# Patient Record
Sex: FEMALE | Race: WHITE | NOT HISPANIC OR LATINO | Employment: OTHER | URBAN - METROPOLITAN AREA
[De-identification: names, ages, dates, MRNs, and addresses within clinical notes are randomized per-mention and may not be internally consistent; named-entity substitution may affect disease eponyms.]

---

## 2017-01-09 DIAGNOSIS — C50.411 MALIGNANT NEOPLASM OF UPPER-OUTER QUADRANT OF RIGHT FEMALE BREAST (HCC): ICD-10-CM

## 2017-01-09 DIAGNOSIS — M81.0 OSTEOPOROSIS: Primary | ICD-10-CM

## 2017-01-11 ENCOUNTER — LAB (OUTPATIENT)
Dept: ONCOLOGY | Facility: HOSPITAL | Age: 72
End: 2017-01-11

## 2017-01-11 ENCOUNTER — OFFICE VISIT (OUTPATIENT)
Dept: ONCOLOGY | Facility: CLINIC | Age: 72
End: 2017-01-11

## 2017-01-11 ENCOUNTER — INFUSION (OUTPATIENT)
Dept: ONCOLOGY | Facility: HOSPITAL | Age: 72
End: 2017-01-11

## 2017-01-11 VITALS
SYSTOLIC BLOOD PRESSURE: 173 MMHG | HEIGHT: 60 IN | DIASTOLIC BLOOD PRESSURE: 82 MMHG | HEART RATE: 67 BPM | WEIGHT: 107 LBS | TEMPERATURE: 97.7 F | RESPIRATION RATE: 16 BRPM | OXYGEN SATURATION: 97 % | BODY MASS INDEX: 21.01 KG/M2

## 2017-01-11 DIAGNOSIS — D3A.8 PRIMARY PANCREATIC NEUROENDOCRINE TUMOR: Primary | ICD-10-CM

## 2017-01-11 DIAGNOSIS — C50.411 MALIGNANT NEOPLASM OF UPPER-OUTER QUADRANT OF RIGHT FEMALE BREAST (HCC): ICD-10-CM

## 2017-01-11 DIAGNOSIS — M81.0 OSTEOPOROSIS: ICD-10-CM

## 2017-01-11 DIAGNOSIS — M81.0 OSTEOPOROSIS: Primary | ICD-10-CM

## 2017-01-11 LAB
ALBUMIN SERPL-MCNC: 4 G/DL (ref 3.5–5.2)
ALBUMIN/GLOB SERPL: 1.4 G/DL
ALP SERPL-CCNC: 66 U/L (ref 39–117)
ALT SERPL W P-5'-P-CCNC: 52 U/L (ref 1–33)
ANION GAP SERPL CALCULATED.3IONS-SCNC: 8.4 MMOL/L
AST SERPL-CCNC: 42 U/L (ref 1–32)
BASOPHILS # BLD AUTO: 0.06 10*3/MM3 (ref 0–0.2)
BASOPHILS NFR BLD AUTO: 1 % (ref 0–1.5)
BILIRUB SERPL-MCNC: 0.2 MG/DL (ref 0.1–1.2)
BUN BLD-MCNC: 12 MG/DL (ref 8–23)
BUN/CREAT SERPL: 13 (ref 7–25)
CALCIUM SPEC-SCNC: 10.6 MG/DL (ref 8.6–10.5)
CHLORIDE SERPL-SCNC: 104 MMOL/L (ref 98–107)
CO2 SERPL-SCNC: 29.6 MMOL/L (ref 22–29)
CREAT BLD-MCNC: 0.92 MG/DL (ref 0.57–1)
DEPRECATED RDW RBC AUTO: 66.5 FL (ref 37–54)
EOSINOPHIL # BLD AUTO: 0.13 10*3/MM3 (ref 0–0.7)
EOSINOPHIL NFR BLD AUTO: 2.1 % (ref 0.3–6.2)
ERYTHROCYTE [DISTWIDTH] IN BLOOD BY AUTOMATED COUNT: 19 % (ref 11.7–13)
GFR SERPL CREATININE-BSD FRML MDRD: 60 ML/MIN/1.73
GLOBULIN UR ELPH-MCNC: 2.9 GM/DL
GLUCOSE BLD-MCNC: 97 MG/DL (ref 65–99)
HCT VFR BLD AUTO: 39.4 % (ref 35.6–45.5)
HGB BLD-MCNC: 13 G/DL (ref 11.9–15.5)
IMM GRANULOCYTES # BLD: 0.01 10*3/MM3 (ref 0–0.03)
IMM GRANULOCYTES NFR BLD: 0.2 % (ref 0–0.5)
LYMPHOCYTES # BLD AUTO: 2.2 10*3/MM3 (ref 0.9–4.8)
LYMPHOCYTES NFR BLD AUTO: 36.4 % (ref 19.6–45.3)
MAGNESIUM SERPL-MCNC: 2.1 MG/DL (ref 1.6–2.4)
MCH RBC QN AUTO: 31.6 PG (ref 26.9–32)
MCHC RBC AUTO-ENTMCNC: 33 G/DL (ref 32.4–36.3)
MCV RBC AUTO: 95.9 FL (ref 80.5–98.2)
MONOCYTES # BLD AUTO: 0.72 10*3/MM3 (ref 0.2–1.2)
MONOCYTES NFR BLD AUTO: 11.9 % (ref 5–12)
NEUTROPHILS # BLD AUTO: 2.93 10*3/MM3 (ref 1.9–8.1)
NEUTROPHILS NFR BLD AUTO: 48.4 % (ref 42.7–76)
NRBC BLD MANUAL-RTO: 0 /100 WBC (ref 0–0)
PHOSPHATE SERPL-MCNC: 2.6 MG/DL (ref 2.5–4.5)
PLATELET # BLD AUTO: 325 10*3/MM3 (ref 140–500)
PMV BLD AUTO: 10.8 FL (ref 6–12)
POTASSIUM BLD-SCNC: 4.3 MMOL/L (ref 3.5–5.2)
PROT SERPL-MCNC: 6.9 G/DL (ref 6–8.5)
RBC # BLD AUTO: 4.11 10*6/MM3 (ref 3.9–5.2)
SODIUM BLD-SCNC: 142 MMOL/L (ref 136–145)
WBC NRBC COR # BLD: 6.05 10*3/MM3 (ref 4.5–10.7)

## 2017-01-11 PROCEDURE — 36415 COLL VENOUS BLD VENIPUNCTURE: CPT

## 2017-01-11 PROCEDURE — 83735 ASSAY OF MAGNESIUM: CPT | Performed by: INTERNAL MEDICINE

## 2017-01-11 PROCEDURE — 85025 COMPLETE CBC W/AUTO DIFF WBC: CPT | Performed by: INTERNAL MEDICINE

## 2017-01-11 PROCEDURE — 80053 COMPREHEN METABOLIC PANEL: CPT | Performed by: INTERNAL MEDICINE

## 2017-01-11 PROCEDURE — 96372 THER/PROPH/DIAG INJ SC/IM: CPT | Performed by: NURSE PRACTITIONER

## 2017-01-11 PROCEDURE — 84100 ASSAY OF PHOSPHORUS: CPT | Performed by: INTERNAL MEDICINE

## 2017-01-11 PROCEDURE — 99212-NC PR NO CHARGE CBC OFFICE OUTPATIENT VISIT 10 MINUTES: Performed by: NURSE PRACTITIONER

## 2017-01-11 PROCEDURE — 25010000002 DENOSUMAB 60 MG/ML SOLUTION: Performed by: NURSE PRACTITIONER

## 2017-01-11 RX ADMIN — DENOSUMAB 60 MG: 60 INJECTION SUBCUTANEOUS at 13:30

## 2017-01-11 NOTE — PROGRESS NOTES
Subjective    REASONS FOR FOLLOWUP:    1. Stage I breast cancer on Arimidex.    2. MEN-1 syndrome with persistent hypercalcemia.    3. 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    4. Removal of fifth parathyroid in 8/09, benign pathology.    5. Mild elevation of chromogranin and gastrin post pancreatectomy of uncertain etiology, probably related to PPIs.    6. Negative MRI of the abdomen 11/2014.    7. Marked elevation, repeat in our office down to 170 which is stable.    8. Palpable left axillary lymph node, evaluation planned.  Mass aspirated and felt to be a benign cyst  9. Reexploration for elevated PTH with no definite parathyroid tissue in 10/2015.                    10.  Osteoporosis on Prolia       History of Present Illness  Mrs. Tong is a pleasant 71 year old here today for Prolia. She reports feeling very well. She is eating and drinking well. No weight loss, fevers, chills, new pain, bony aches, shortness of breath, or palpable lumps.     She reports fatigue but her energy level has slightly improved since receiving IV iron. Her hgb is improved at 13.0.        Active Ambulatory Problems     Diagnosis Date Noted   • Primary pancreatic neuroendocrine tumor 06/17/2016   • MEN 1 (multiple endocrine neoplasia) 06/17/2016   • Malignant neoplasm of upper-outer quadrant of right female breast 06/17/2016   • Osteoporosis 07/01/2016   • Iron deficiency 10/12/2016     Resolved Ambulatory Problems     Diagnosis Date Noted   • No Resolved Ambulatory Problems     Past Medical History   Diagnosis Date   • Cancer    • H/O Endocrine tumors    • Hyperparathyroidism    • Intolerance of oral bisphosphonate therapy    • MEN 1 syndrome    • Osteopenia      Past Surgical History   Procedure Laterality Date   • Parathyroidectomy  1984     Subtotal   • Spine surgery  1991     Degenerative disks and herniated disks    bilateral mastectomies  Tristan  "procedure    Review of Systems   Constitutional: Positive for fatigue.   HENT: Negative.    Eyes: Negative.    Respiratory: Negative.    Cardiovascular: Negative.    Gastrointestinal: Negative.    Endocrine: Negative.    Genitourinary: Negative.    Musculoskeletal: Negative.    Skin: Negative.    Allergic/Immunologic: Negative.    Neurological: Negative.    Hematological: Negative.    Psychiatric/Behavioral: Negative.       A comprehensive 14 point review of systems was performed and was negative except as mentioned.    Medications:  The current medication list was reviewed in the EMR    ALLERGIES:    Allergies   Allergen Reactions   • Risedronate Sodium Other (See Comments)     Headache,GASTRIC REFLUX       Objective      Vitals:    01/11/17 1307   BP: 173/82   Pulse: 67   Resp: 16   Temp: 97.7 °F (36.5 °C)   TempSrc: Oral   SpO2: 97%   Weight: 107 lb (48.5 kg)   Height: 60.24\" (153 cm)   PainSc: 0-No pain     Current Status 1/11/2017   ECOG score 0       Physical Exam   Constitutional: She is oriented to person, place, and time. She appears well-developed and well-nourished.   Eyes: Conjunctivae and EOM are normal. Pupils are equal, round, and reactive to light.   Neck: Normal range of motion. Neck supple.   Cardiovascular: Normal rate and regular rhythm.    Pulmonary/Chest: Effort normal and breath sounds normal.   Abdominal: Soft. Bowel sounds are normal. She exhibits no mass. There is no tenderness. There is no guarding.   Musculoskeletal: Normal range of motion.   Neurological: She is alert and oriented to person, place, and time.   Skin: Skin is warm and dry. No rash noted.   Psychiatric: She has a normal mood and affect. Her behavior is normal.   Nursing note and vitals reviewed.            RECENT LABS:  Hematology WBC   Date Value Ref Range Status   01/11/2017 6.05 4.50 - 10.70 10*3/mm3 Final   05/25/2015 12.04 (H) 4.80 - 10.80 K/Cumm Final     RBC   Date Value Ref Range Status   01/11/2017 4.11 3.90 - 5.20 " 10*6/mm3 Final   05/25/2015 3.63 (L) 4.20 - 5.40 Million Final     HEMOGLOBIN   Date Value Ref Range Status   01/11/2017 13.0 11.9 - 15.5 g/dL Final   05/25/2015 11.4 (L) 12.0 - 16.0 g/dL Final     HEMATOCRIT   Date Value Ref Range Status   01/11/2017 39.4 35.6 - 45.5 % Final   05/25/2015 35.0 (L) 37.0 - 47.0 % Final     PLATELETS   Date Value Ref Range Status   01/11/2017 325 140 - 500 10*3/mm3 Final   05/25/2015 310 140 - 500 K/Cumm Final              Assessment/Plan    Assessment  1. T 1cN 0  appear positive HER-2 negative left breast cancer treated with bilateral mastectomies 5 years and aromatase inhibitors with an Oncotype score of 7   2.MEN1 syndrome post parathyroidectomy with persistent elevated calcium and inability to find her remaining parathyroid adenoma   3.pancreatic neuroendocrine carcinoma post Whipple procedure chromogranin and gastrin levels being followed   4.osteoporosis on Prolia. She is scheduled for Prolia today.  5.pulmonary nodules?  Granuloma being followed   6.remote history of uterine cancer post hysterectomy ovaries still in place   7.  Anemia multifactorial with iron deficiency in the past and chronic renal insufficiency stage III. She received IV Fereheme in October 2016 and her hgb is within normal limits today at 13.    Plan   1. Go forward with Prolia as scheduled today. She will return as already scheduled on 4/10/2016 to see Dr. Montalvo.   2. We reviewed signs and symptoms for which she needs to call the office.   3              1/11/2017      CC:

## 2017-04-10 ENCOUNTER — OFFICE VISIT (OUTPATIENT)
Dept: ONCOLOGY | Facility: CLINIC | Age: 72
End: 2017-04-10

## 2017-04-10 ENCOUNTER — LAB (OUTPATIENT)
Dept: OTHER | Facility: HOSPITAL | Age: 72
End: 2017-04-10

## 2017-04-10 VITALS
RESPIRATION RATE: 12 BRPM | HEART RATE: 65 BPM | TEMPERATURE: 98.1 F | WEIGHT: 103 LBS | SYSTOLIC BLOOD PRESSURE: 147 MMHG | DIASTOLIC BLOOD PRESSURE: 77 MMHG | BODY MASS INDEX: 20.22 KG/M2 | HEIGHT: 60 IN | OXYGEN SATURATION: 99 %

## 2017-04-10 DIAGNOSIS — M81.0 OSTEOPOROSIS: ICD-10-CM

## 2017-04-10 DIAGNOSIS — C7A.8 OTHER MALIGNANT NEUROENDOCRINE TUMORS (HCC): ICD-10-CM

## 2017-04-10 DIAGNOSIS — D3A.8 PRIMARY PANCREATIC NEUROENDOCRINE TUMOR: ICD-10-CM

## 2017-04-10 DIAGNOSIS — C50.411 MALIGNANT NEOPLASM OF UPPER-OUTER QUADRANT OF RIGHT FEMALE BREAST (HCC): ICD-10-CM

## 2017-04-10 DIAGNOSIS — E31.21 MEN 1 (MULTIPLE ENDOCRINE NEOPLASIA) (HCC): ICD-10-CM

## 2017-04-10 DIAGNOSIS — E31.21 MEN 1 (MULTIPLE ENDOCRINE NEOPLASIA) (HCC): Primary | ICD-10-CM

## 2017-04-10 LAB
ALBUMIN SERPL-MCNC: 4.1 G/DL (ref 3.5–5.2)
ALBUMIN/GLOB SERPL: 1.4 G/DL
ALP SERPL-CCNC: 68 U/L (ref 39–117)
ALT SERPL W P-5'-P-CCNC: 33 U/L (ref 1–33)
ANION GAP SERPL CALCULATED.3IONS-SCNC: 8.5 MMOL/L
AST SERPL-CCNC: 36 U/L (ref 1–32)
BASOPHILS # BLD AUTO: 0.07 10*3/MM3 (ref 0–0.2)
BASOPHILS NFR BLD AUTO: 1.4 % (ref 0–1.5)
BILIRUB SERPL-MCNC: 0.3 MG/DL (ref 0.1–1.2)
BUN BLD-MCNC: 13 MG/DL (ref 8–23)
BUN/CREAT SERPL: 12.4 (ref 7–25)
CALCIUM SPEC-SCNC: 10.3 MG/DL (ref 8.6–10.5)
CHLORIDE SERPL-SCNC: 104 MMOL/L (ref 98–107)
CO2 SERPL-SCNC: 29.5 MMOL/L (ref 22–29)
CREAT BLD-MCNC: 1.05 MG/DL (ref 0.57–1)
DEPRECATED RDW RBC AUTO: 49.6 FL (ref 37–54)
EOSINOPHIL # BLD AUTO: 0.12 10*3/MM3 (ref 0–0.7)
EOSINOPHIL NFR BLD AUTO: 2.4 % (ref 0.3–6.2)
ERYTHROCYTE [DISTWIDTH] IN BLOOD BY AUTOMATED COUNT: 13.4 % (ref 11.7–13)
GFR SERPL CREATININE-BSD FRML MDRD: 52 ML/MIN/1.73
GLOBULIN UR ELPH-MCNC: 2.9 GM/DL
GLUCOSE BLD-MCNC: 55 MG/DL (ref 65–99)
HCT VFR BLD AUTO: 40.8 % (ref 35.6–45.5)
HGB BLD-MCNC: 13.4 G/DL (ref 11.9–15.5)
IMM GRANULOCYTES # BLD: 0.01 10*3/MM3 (ref 0–0.03)
IMM GRANULOCYTES NFR BLD: 0.2 % (ref 0–0.5)
LYMPHOCYTES # BLD AUTO: 1.94 10*3/MM3 (ref 0.9–4.8)
LYMPHOCYTES NFR BLD AUTO: 39.6 % (ref 19.6–45.3)
MCH RBC QN AUTO: 32.9 PG (ref 26.9–32)
MCHC RBC AUTO-ENTMCNC: 32.8 G/DL (ref 32.4–36.3)
MCV RBC AUTO: 100.2 FL (ref 80.5–98.2)
MONOCYTES # BLD AUTO: 0.64 10*3/MM3 (ref 0.2–1.2)
MONOCYTES NFR BLD AUTO: 13.1 % (ref 5–12)
NEUTROPHILS # BLD AUTO: 2.12 10*3/MM3 (ref 1.9–8.1)
NEUTROPHILS NFR BLD AUTO: 43.3 % (ref 42.7–76)
NRBC BLD MANUAL-RTO: 0 /100 WBC (ref 0–0)
PLATELET # BLD AUTO: 336 10*3/MM3 (ref 140–500)
PMV BLD AUTO: 10.8 FL (ref 6–12)
POTASSIUM BLD-SCNC: 4.1 MMOL/L (ref 3.5–5.2)
PROT SERPL-MCNC: 7 G/DL (ref 6–8.5)
RBC # BLD AUTO: 4.07 10*6/MM3 (ref 3.9–5.2)
SODIUM BLD-SCNC: 142 MMOL/L (ref 136–145)
WBC NRBC COR # BLD: 4.9 10*3/MM3 (ref 4.5–10.7)

## 2017-04-10 PROCEDURE — 85025 COMPLETE CBC W/AUTO DIFF WBC: CPT | Performed by: INTERNAL MEDICINE

## 2017-04-10 PROCEDURE — 86316 IMMUNOASSAY TUMOR OTHER: CPT | Performed by: INTERNAL MEDICINE

## 2017-04-10 PROCEDURE — 82941 ASSAY OF GASTRIN: CPT | Performed by: INTERNAL MEDICINE

## 2017-04-10 PROCEDURE — 80053 COMPREHEN METABOLIC PANEL: CPT | Performed by: INTERNAL MEDICINE

## 2017-04-10 PROCEDURE — 36415 COLL VENOUS BLD VENIPUNCTURE: CPT

## 2017-04-10 PROCEDURE — 99214 OFFICE O/P EST MOD 30 MIN: CPT | Performed by: INTERNAL MEDICINE

## 2017-04-10 NOTE — PROGRESS NOTES
Subjective    REASONS FOR FOLLOWUP:    1. Stage I breast cancer on Arimidex.    2. MEN-1 syndrome with persistent hypercalcemia.    3. 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    4. Removal of fifth parathyroid in 8/09, benign pathology.    5. Mild elevation of chromogranin and gastrin post pancreatectomy of uncertain etiology, probably related to PPIs.    6. Negative MRI of the abdomen 11/2014.    7. Marked elevation, repeat in our office down to 170 which is stable.    8. Palpable left axillary lymph node, evaluation planned.  Mass aspirated and felt to be a benign cyst  9. Reexploration for elevated PTH with no definite parathyroid tissue in 10/2015.                    10.  Osteoporosis on Prolia       History of Present Illness  patient is a 71-year-old white female with MEN 1 syndrome with multiple parathyroid adenoma as pancreatic neuroendocrine carcinoma producing gastrin and a stage I breast cancer treated with 5 years of adjuvant Arimidex and bilateral mastectomies.  She is here for routine follow-up and to continue her prolia treatments for her osteoporosis     Since I saw her last she has had no new complaints except for some abdominal discomfort which resolved.  We did CAT scans for review this and I called her with the results which essentially showed no abnormalities which were new except for multiple cysts in her kidneys and liver which were stable.her lung nodules of been stable with a repeat scan in 9-12 months is recommended    She did not make her genetic counseling appointment which we had done because of a maternal history of breast and ovarian cancer which we felt was distinct from her MEN  1 syndrome and she does not want to do this at this time.  CAT scan of the chest shows stability of the pulmonary nodules suggesting benign etiology.  We wanted to follow-up on the lung nodule seen on her scan last year some  nodules that been present since  and others were new.  Her calcium remains high in we are unable to find her remaining parathyroid surgeon no surgery is planned.    Chromogranin and gastrin levels of been drawn today and are pending, the B12 was normal and the ferritin is pending.      PAST MEDICAL HISTORY: Significant for MEN-1 syndrome with hyperparathyroidism for which she had a subtotal parathyroidectomy in . She has had degenerative disks and had herniated disk operated on in . She denies hypertension, diabetes, peptic ulcer disease, heart attack, stroke, hepatitis, thyroid problems, anemia or rheumatic fever. Mild osteopenia. She is intolerant of bisphosphona t es and calcium. She is -1, para-1, first childbirth was age 28. Menarche was at age 13. Menopause in her early 50s. She did not take hormone therapy. The patient has persistent elevation of her calcium despite parathyroidectomy in the 11 range .   Active Ambulatory Problems     Diagnosis Date Noted   • Primary pancreatic neuroendocrine tumor 2016   • MEN 1 (multiple endocrine neoplasia) 2016   • Malignant neoplasm of upper-outer quadrant of right female breast 2016   • Osteoporosis 2016   • Iron deficiency 10/12/2016     Resolved Ambulatory Problems     Diagnosis Date Noted   • No Resolved Ambulatory Problems     Past Medical History:   Diagnosis Date   • Cancer    • H/O Endocrine tumors    • Hyperparathyroidism    • Intolerance of oral bisphosphonate therapy    • MEN 1 syndrome    • Osteopenia      Past Surgical History:   Procedure Laterality Date   • PARATHYROIDECTOMY      Subtotal   • SPINE SURGERY      Degenerative disks and herniated disks    bilateral mastectomies  Whipple procedure    ONCOLOGIC HISTORY:   #1: The patient had an in situ carcinoma of the uterus and had a hysterectomy in  with no radiation and her ovaries were left in place.     #2: Incidentally noted mammographic abnormality  of the right breast at 9 o'clock measuring 15 mm and at 1 o'clock measuring 9 mm. Biopsy of the 1 o'clock lesion was benign. The 9 o' clock lesion showed a low grade ductal carcinoma measuring 1 cm in greatest dimension, ER/WI positive, HER/2 negative, Ki-67 nae vated at 23%. The patient underwent bilateral mastectomies and mastectomy specimen showed a 1.4 cm residual breast cancer, grade 2 measuring 14 mm with no angiolymphatic invasion and clear margins, sentinel node was negative for malignancy. The invasive carcinoma had mixed duct and lobular features. Left breast was benign.   Onco type DX score showed a 7 which is low risk. Treatment with aromatase inhibitor plus Reclast is planned unless bone density shows marked worsening in which case we will give tamoxifen for two years and three years of an AI.   The patient's bone density shows osteopenia. The patient is on Arimidex with plans to start Reclast as she is intolerant to oral bisphosphonates.     #3 Patient had multiple pancreatic tumors which were endocrin e tumors, nonsecretory apparently per her history and had a total pancreatectomy and pancreaticoduodenectomy with hepatojejunostomy, cholecystectomy, splenectomy in December and retroperitoneal lymph node dissection with finding of microscopic metastatic disease in 1 of 26 lymph nodes.   No date on adjuvant treatment was available in the setting and none was given.   Gastrin level went up to 314 in March, back down to 124 in May of 2010. No treatment given. Chromogranin A was up to 18 in March, down to 14 in May, no treatment given.   Chromogranin A up to 28 on 6/23/10. Octreotide scan planned in early 2011.   CT scans chest, abdomen and pelvis 03/28/11 shows no evidence of recurrent disease. Chromogranin is 26, gastrin 192, a little higher than October but the same as last spring and lack of upward trend is reassuring.   CT scans dated 10/10/2011 showed no evidence of recurrent disease. Stable cavernous  hemangioma in the liver. Chromogranin level up to 100 on 09/28/2011 which is up from 39 in June but thi s is a new lab and we are waiting to see if this is a trend or a new baseline.   The patient was seen on 05/12/2012 with no complaints at four years on Arimidex. Chromogranin remains in the 100s. Gastrin is in the 190 range with negative CT scan.   The patient was seen on 1-08-13, doing fairly well with stable gastrin and chromogranin level of 90. Prolia was given in May but will not be renewed because she does not have osteoporosis but has very severe osteopenia.     The patient stopped Arimidex in July 2013, went on Evista for bone density issues. Chromogranin levels remain stable, but I do not have the levels from Dr. Bell' s office as of 12/2013. Annual followup to be continued with MRI of the liver at her next visit in one year.   Patient seen on 11/25/2014 and MRI of the abdomen and liver is unremarkable except for one new cyst in the liver and increasing number of renal cysts which are chronic and decrease in size of hemangioma in the liver that was noted previo usly. Chromogranin and gastrin levels remains stable in the endocrinologist' s office and I think these elevations are due to her PPIs. Calcium levels is mildly elevated and the parathyroid cannot be located and is being watched. Prolia to be given based on her bone density testing, which is pending.   The patient was seen on 06/16/2015 with marked elevation of chromogranin to 1600 (upper limit of normal 100 in new lab). Octreotide scan was done and negative. Repeat chromogranin testing was planned and if this is still elevated in our lab we will do an MRI of the abdomen to further evaluate and probably an upper endoscopy would be indicated to look for small bowel tumor chromogranin level  160 repeat.     SOCIAL HISTORY: She is  and lives with her . She works in an office. She does not smoke or drink. She has no risk factors for HIV.  "    FAMILY HISTORY: Father  at 71 of complications of Zollinger-Reed syndrome and kidney failure. Mother  at 64 of ovarian cancer metastatic to the colon. She has three half-brothers, one half-sister from her father. No ne of them have MEN-1 syndrome. She has a maternal grandmother who had probable breast cancer in her 50s and maternal great grandmother who  of breast cancer in her 50s.     Review of Systems   A comprehensive 14 point review of systems was performed and was negative except as mentioned.    Medications:  The current medication list was reviewed in the EMR    ALLERGIES:    Allergies   Allergen Reactions   • Risedronate Sodium Other (See Comments)     Headache,GASTRIC REFLUX       Objective      Vitals:    04/10/17 1300   Pulse: 65   Temp: 98.1 °F (36.7 °C)   TempSrc: Oral   SpO2: 99%   Weight: 103 lb (46.7 kg)   Height: 60.24\" (153 cm)   PainSc: 0-No pain     Current Status 4/10/2017   ECOG score 0       Physical Exam    GENERAL:  Well-developed, well-nourished in no acute distress.   SKIN:  Warm, dry without rashes, purpura or petechiae.  HEAD:  Normocephalic.  EYES:  Pupils equal, round and reactive to light.  EOMs intact.  Conjunctivae normal.  EARS:  Hearing intact.  NOSE:  Septum midline.  No excoriations or nasal discharge.  MOUTH:  Tongue is well-papillated; no stomatitis or ulcers.  Lips normal.  THROAT:  Oropharynx without lesions or exudates.  NECK:  Supple with good range of motion; no thyromegaly or masses, no JVD.  LYMPHATICS:  No cervical, supraclavicular, axillary or inguinal adenopathy.  CHEST:  Lungs clear to percussion and auscultation. Good airflow.  BREASTS: Bilateral chest wall are benign with no masses   CARDIAC:  Regular rate and rhythm without murmurs, rubs or gallops. Normal S1,S2.  ABDOMEN:  Soft, nontender with no organomegaly or masses.  EXTREMITIES:  No clubbing, cyanosis or edema.  NEUROLOGICAL:  Cranial Nerves II-XII grossly intact.  No focal neurological " deficits.  PSYCHIATRIC:  Normal affect and mood.        RECENT LABS:  Hematology WBC   Date Value Ref Range Status   04/10/2017 4.90 4.50 - 10.70 10*3/mm3 Final     RBC   Date Value Ref Range Status   04/10/2017 4.07 3.90 - 5.20 10*6/mm3 Final     Hemoglobin   Date Value Ref Range Status   04/10/2017 13.4 11.9 - 15.5 g/dL Final     Hematocrit   Date Value Ref Range Status   04/10/2017 40.8 35.6 - 45.5 % Final     Platelets   Date Value Ref Range Status   04/10/2017 336 140 - 500 10*3/mm3 Final          abdominal wall.      IMPRESSION:  Postoperative changes as described. Stable hemangioma in the  right lobe of the liver and a few small cysts within both lobes of the  liver. Small bilateral renal cysts. Moderately large amount stool within  the colon. Otherwise unremarkable CT scan of the abdomen and pelvis. No  acute process is identified. There is no evidence of metastatic disease.          Comment: Also noted but not mentioned above is a 7 mm diameter  noncalcified pulmonary nodule in the right middle lobe. This is  unchanged since the MRI dated 2015.      This report was finalized on 10/28/2016    Assessment/Plan   1. T 1cN 0  appear positive HER-2 negative left breast cancer treated with bilateral mastectomies 5 years of an  aromatase inhibitors with an Oncotype score of 7   2.MEN1 syndrome post parathyroidectomy with persistent elevated calcium and inability to find her remaining parathyroid adenoma   3.pancreatic neuroendocrine carcinoma post Whipple procedure  chromogranin and gastrin levels being followed   4.osteoporosis on Prolia   5.pulmonary nodules?  Granuloma -stable on CAT scan for 1. yrs  6.remote history of uterine cancer post hysterectomy ovaries still in place -mother  with ovarian cancer but she has declined genetic testing for now  7.  Anemia multifactorial with iron deficiency in the past and chronic renal insufficiency stage III-low ferritin consistent with iron deficiency IV iron  planned    Plan   1.Prolia in July 2. she will return in 6 months with a chromogranin level and gastrin and we will schedule one last chest CT to follow-up nodules at that visit                  4/10/2017      CC:

## 2017-04-13 LAB — GASTRIN SERPL-MCNC: 171 PG/ML (ref 0–115)

## 2017-04-14 LAB — CGA SERPL-SCNC: 26 NMOL/L (ref 0–5)

## 2017-07-13 DIAGNOSIS — M81.0 OSTEOPOROSIS: ICD-10-CM

## 2017-07-13 DIAGNOSIS — E61.1 IRON DEFICIENCY: Primary | ICD-10-CM

## 2017-07-14 ENCOUNTER — INFUSION (OUTPATIENT)
Dept: ONCOLOGY | Facility: HOSPITAL | Age: 72
End: 2017-07-14

## 2017-07-14 ENCOUNTER — LAB (OUTPATIENT)
Dept: OTHER | Facility: HOSPITAL | Age: 72
End: 2017-07-14

## 2017-07-14 VITALS
TEMPERATURE: 98.8 F | BODY MASS INDEX: 19.53 KG/M2 | HEART RATE: 83 BPM | WEIGHT: 100.8 LBS | DIASTOLIC BLOOD PRESSURE: 79 MMHG | OXYGEN SATURATION: 98 % | SYSTOLIC BLOOD PRESSURE: 152 MMHG

## 2017-07-14 DIAGNOSIS — C7A.8 OTHER MALIGNANT NEUROENDOCRINE TUMORS (HCC): ICD-10-CM

## 2017-07-14 DIAGNOSIS — E61.1 IRON DEFICIENCY: ICD-10-CM

## 2017-07-14 DIAGNOSIS — M81.0 OSTEOPOROSIS: ICD-10-CM

## 2017-07-14 DIAGNOSIS — M81.0 OSTEOPOROSIS: Primary | ICD-10-CM

## 2017-07-14 DIAGNOSIS — C50.411 MALIGNANT NEOPLASM OF UPPER-OUTER QUADRANT OF RIGHT FEMALE BREAST (HCC): ICD-10-CM

## 2017-07-14 DIAGNOSIS — E31.21 MEN 1 (MULTIPLE ENDOCRINE NEOPLASIA) (HCC): ICD-10-CM

## 2017-07-14 LAB
ALBUMIN SERPL-MCNC: 4.3 G/DL (ref 3.5–5.2)
ALBUMIN/GLOB SERPL: 1.6 G/DL
ALP SERPL-CCNC: 69 U/L (ref 39–117)
ALT SERPL W P-5'-P-CCNC: 72 U/L (ref 1–33)
ANION GAP SERPL CALCULATED.3IONS-SCNC: 9.1 MMOL/L
AST SERPL-CCNC: 65 U/L (ref 1–32)
BASOPHILS # BLD AUTO: 0.05 10*3/MM3 (ref 0–0.2)
BASOPHILS NFR BLD AUTO: 1 % (ref 0–1.5)
BILIRUB SERPL-MCNC: 0.4 MG/DL (ref 0.1–1.2)
BUN BLD-MCNC: 16 MG/DL (ref 8–23)
BUN/CREAT SERPL: 14.7 (ref 7–25)
CALCIUM SPEC-SCNC: 10.6 MG/DL (ref 8.6–10.5)
CHLORIDE SERPL-SCNC: 105 MMOL/L (ref 98–107)
CO2 SERPL-SCNC: 29.9 MMOL/L (ref 22–29)
CREAT BLD-MCNC: 1.09 MG/DL (ref 0.57–1)
DEPRECATED RDW RBC AUTO: 49.6 FL (ref 37–54)
EOSINOPHIL # BLD AUTO: 0.12 10*3/MM3 (ref 0–0.7)
EOSINOPHIL NFR BLD AUTO: 2.5 % (ref 0.3–6.2)
ERYTHROCYTE [DISTWIDTH] IN BLOOD BY AUTOMATED COUNT: 13.6 % (ref 11.7–13)
FERRITIN SERPL-MCNC: 159.1 NG/ML (ref 13–150)
GFR SERPL CREATININE-BSD FRML MDRD: 49 ML/MIN/1.73
GLOBULIN UR ELPH-MCNC: 2.7 GM/DL
GLUCOSE BLD-MCNC: 88 MG/DL (ref 65–99)
HCT VFR BLD AUTO: 41.1 % (ref 35.6–45.5)
HGB BLD-MCNC: 13.6 G/DL (ref 11.9–15.5)
IMM GRANULOCYTES # BLD: 0.01 10*3/MM3 (ref 0–0.03)
IMM GRANULOCYTES NFR BLD: 0.2 % (ref 0–0.5)
IRON 24H UR-MRATE: 108 MCG/DL (ref 37–145)
IRON SATN MFR SERPL: 29 % (ref 20–50)
LYMPHOCYTES # BLD AUTO: 1.32 10*3/MM3 (ref 0.9–4.8)
LYMPHOCYTES NFR BLD AUTO: 27.1 % (ref 19.6–45.3)
MAGNESIUM SERPL-MCNC: 2.2 MG/DL (ref 1.6–2.4)
MCH RBC QN AUTO: 32.5 PG (ref 26.9–32)
MCHC RBC AUTO-ENTMCNC: 33.1 G/DL (ref 32.4–36.3)
MCV RBC AUTO: 98.3 FL (ref 80.5–98.2)
MONOCYTES # BLD AUTO: 0.67 10*3/MM3 (ref 0.2–1.2)
MONOCYTES NFR BLD AUTO: 13.8 % (ref 5–12)
NEUTROPHILS # BLD AUTO: 2.7 10*3/MM3 (ref 1.9–8.1)
NEUTROPHILS NFR BLD AUTO: 55.4 % (ref 42.7–76)
NRBC BLD MANUAL-RTO: 0 /100 WBC (ref 0–0)
PHOSPHATE SERPL-MCNC: 3 MG/DL (ref 2.5–4.5)
PLATELET # BLD AUTO: 318 10*3/MM3 (ref 140–500)
PMV BLD AUTO: 10.6 FL (ref 6–12)
POTASSIUM BLD-SCNC: 4.3 MMOL/L (ref 3.5–5.2)
PROT SERPL-MCNC: 7 G/DL (ref 6–8.5)
RBC # BLD AUTO: 4.18 10*6/MM3 (ref 3.9–5.2)
SODIUM BLD-SCNC: 144 MMOL/L (ref 136–145)
TIBC SERPL-MCNC: 368 MCG/DL (ref 298–536)
TRANSFERRIN SERPL-MCNC: 247 MG/DL (ref 200–360)
WBC NRBC COR # BLD: 4.87 10*3/MM3 (ref 4.5–10.7)

## 2017-07-14 PROCEDURE — 84100 ASSAY OF PHOSPHORUS: CPT | Performed by: INTERNAL MEDICINE

## 2017-07-14 PROCEDURE — 96372 THER/PROPH/DIAG INJ SC/IM: CPT | Performed by: INTERNAL MEDICINE

## 2017-07-14 PROCEDURE — 82728 ASSAY OF FERRITIN: CPT | Performed by: INTERNAL MEDICINE

## 2017-07-14 PROCEDURE — 83540 ASSAY OF IRON: CPT | Performed by: INTERNAL MEDICINE

## 2017-07-14 PROCEDURE — 83735 ASSAY OF MAGNESIUM: CPT | Performed by: INTERNAL MEDICINE

## 2017-07-14 PROCEDURE — 25010000002 DENOSUMAB 60 MG/ML SOLUTION: Performed by: INTERNAL MEDICINE

## 2017-07-14 PROCEDURE — 84466 ASSAY OF TRANSFERRIN: CPT | Performed by: INTERNAL MEDICINE

## 2017-07-14 PROCEDURE — 36415 COLL VENOUS BLD VENIPUNCTURE: CPT

## 2017-07-14 PROCEDURE — 86316 IMMUNOASSAY TUMOR OTHER: CPT | Performed by: INTERNAL MEDICINE

## 2017-07-14 PROCEDURE — 85025 COMPLETE CBC W/AUTO DIFF WBC: CPT | Performed by: INTERNAL MEDICINE

## 2017-07-14 PROCEDURE — 80053 COMPREHEN METABOLIC PANEL: CPT | Performed by: INTERNAL MEDICINE

## 2017-07-14 RX ADMIN — DENOSUMAB 60 MG: 60 INJECTION SUBCUTANEOUS at 10:53

## 2017-07-17 LAB — CGA SERPL-SCNC: 23 NMOL/L (ref 0–5)

## 2017-10-11 DIAGNOSIS — C50.411 MALIGNANT NEOPLASM OF UPPER-OUTER QUADRANT OF RIGHT FEMALE BREAST, UNSPECIFIED ESTROGEN RECEPTOR STATUS (HCC): Primary | ICD-10-CM

## 2017-10-16 ENCOUNTER — APPOINTMENT (OUTPATIENT)
Dept: ONCOLOGY | Facility: CLINIC | Age: 72
End: 2017-10-16

## 2017-10-16 ENCOUNTER — OFFICE VISIT (OUTPATIENT)
Dept: ONCOLOGY | Facility: CLINIC | Age: 72
End: 2017-10-16

## 2017-10-16 ENCOUNTER — LAB (OUTPATIENT)
Dept: OTHER | Facility: HOSPITAL | Age: 72
End: 2017-10-16

## 2017-10-16 ENCOUNTER — APPOINTMENT (OUTPATIENT)
Dept: OTHER | Facility: HOSPITAL | Age: 72
End: 2017-10-16

## 2017-10-16 VITALS
SYSTOLIC BLOOD PRESSURE: 156 MMHG | HEART RATE: 77 BPM | RESPIRATION RATE: 12 BRPM | WEIGHT: 99 LBS | HEIGHT: 60 IN | DIASTOLIC BLOOD PRESSURE: 76 MMHG | OXYGEN SATURATION: 98 % | TEMPERATURE: 98 F | BODY MASS INDEX: 19.44 KG/M2

## 2017-10-16 DIAGNOSIS — E31.21 MEN 1 (MULTIPLE ENDOCRINE NEOPLASIA) (HCC): ICD-10-CM

## 2017-10-16 DIAGNOSIS — Z17.0 MALIGNANT NEOPLASM OF UPPER-OUTER QUADRANT OF RIGHT BREAST IN FEMALE, ESTROGEN RECEPTOR POSITIVE (HCC): ICD-10-CM

## 2017-10-16 DIAGNOSIS — C50.411 MALIGNANT NEOPLASM OF UPPER-OUTER QUADRANT OF RIGHT BREAST IN FEMALE, ESTROGEN RECEPTOR POSITIVE (HCC): ICD-10-CM

## 2017-10-16 DIAGNOSIS — D3A.8 PRIMARY PANCREATIC NEUROENDOCRINE TUMOR: ICD-10-CM

## 2017-10-16 DIAGNOSIS — M81.8 OTHER OSTEOPOROSIS WITHOUT CURRENT PATHOLOGICAL FRACTURE: ICD-10-CM

## 2017-10-16 DIAGNOSIS — E61.1 IRON DEFICIENCY: Primary | ICD-10-CM

## 2017-10-16 DIAGNOSIS — C50.411 MALIGNANT NEOPLASM OF UPPER-OUTER QUADRANT OF RIGHT FEMALE BREAST, UNSPECIFIED ESTROGEN RECEPTOR STATUS (HCC): ICD-10-CM

## 2017-10-16 LAB
ALBUMIN SERPL-MCNC: 4.4 G/DL (ref 3.5–5.2)
ALBUMIN/GLOB SERPL: 1.6 G/DL
ALP SERPL-CCNC: 72 U/L (ref 39–117)
ALT SERPL W P-5'-P-CCNC: 41 U/L (ref 1–33)
ANION GAP SERPL CALCULATED.3IONS-SCNC: 10.2 MMOL/L
AST SERPL-CCNC: 39 U/L (ref 1–32)
BASOPHILS # BLD AUTO: 0.07 10*3/MM3 (ref 0–0.2)
BASOPHILS NFR BLD AUTO: 1.3 % (ref 0–1.5)
BILIRUB SERPL-MCNC: 0.5 MG/DL (ref 0.1–1.2)
BUN BLD-MCNC: 12 MG/DL (ref 8–23)
BUN/CREAT SERPL: 11.7 (ref 7–25)
CALCIUM SPEC-SCNC: 11.1 MG/DL (ref 8.6–10.5)
CHLORIDE SERPL-SCNC: 99 MMOL/L (ref 98–107)
CO2 SERPL-SCNC: 28.8 MMOL/L (ref 22–29)
CREAT BLD-MCNC: 1.03 MG/DL (ref 0.57–1)
DEPRECATED RDW RBC AUTO: 50.8 FL (ref 37–54)
EOSINOPHIL # BLD AUTO: 0.2 10*3/MM3 (ref 0–0.7)
EOSINOPHIL NFR BLD AUTO: 3.8 % (ref 0.3–6.2)
ERYTHROCYTE [DISTWIDTH] IN BLOOD BY AUTOMATED COUNT: 13.8 % (ref 11.7–13)
FERRITIN SERPL-MCNC: 151.8 NG/ML (ref 13–150)
GFR SERPL CREATININE-BSD FRML MDRD: 53 ML/MIN/1.73
GLOBULIN UR ELPH-MCNC: 2.8 GM/DL
GLUCOSE BLD-MCNC: 244 MG/DL (ref 65–99)
HCT VFR BLD AUTO: 41.6 % (ref 35.6–45.5)
HGB BLD-MCNC: 13.7 G/DL (ref 11.9–15.5)
IMM GRANULOCYTES # BLD: 0.01 10*3/MM3 (ref 0–0.03)
IMM GRANULOCYTES NFR BLD: 0.2 % (ref 0–0.5)
LYMPHOCYTES # BLD AUTO: 1.38 10*3/MM3 (ref 0.9–4.8)
LYMPHOCYTES NFR BLD AUTO: 26.4 % (ref 19.6–45.3)
MCH RBC QN AUTO: 32.6 PG (ref 26.9–32)
MCHC RBC AUTO-ENTMCNC: 32.9 G/DL (ref 32.4–36.3)
MCV RBC AUTO: 99 FL (ref 80.5–98.2)
MONOCYTES # BLD AUTO: 0.6 10*3/MM3 (ref 0.2–1.2)
MONOCYTES NFR BLD AUTO: 11.5 % (ref 5–12)
NEUTROPHILS # BLD AUTO: 2.96 10*3/MM3 (ref 1.9–8.1)
NEUTROPHILS NFR BLD AUTO: 56.8 % (ref 42.7–76)
NRBC BLD MANUAL-RTO: 0 /100 WBC (ref 0–0)
PLATELET # BLD AUTO: 366 10*3/MM3 (ref 140–500)
PMV BLD AUTO: 10.9 FL (ref 6–12)
POTASSIUM BLD-SCNC: 4.8 MMOL/L (ref 3.5–5.2)
PROT SERPL-MCNC: 7.2 G/DL (ref 6–8.5)
RBC # BLD AUTO: 4.2 10*6/MM3 (ref 3.9–5.2)
SODIUM BLD-SCNC: 138 MMOL/L (ref 136–145)
WBC NRBC COR # BLD: 5.22 10*3/MM3 (ref 4.5–10.7)

## 2017-10-16 PROCEDURE — 82728 ASSAY OF FERRITIN: CPT | Performed by: INTERNAL MEDICINE

## 2017-10-16 PROCEDURE — 85025 COMPLETE CBC W/AUTO DIFF WBC: CPT | Performed by: INTERNAL MEDICINE

## 2017-10-16 PROCEDURE — 80053 COMPREHEN METABOLIC PANEL: CPT | Performed by: INTERNAL MEDICINE

## 2017-10-16 PROCEDURE — 99214 OFFICE O/P EST MOD 30 MIN: CPT | Performed by: INTERNAL MEDICINE

## 2017-10-16 PROCEDURE — 36415 COLL VENOUS BLD VENIPUNCTURE: CPT

## 2017-10-16 NOTE — PROGRESS NOTES
Subjective    REASONS FOR FOLLOWUP:    1. Stage I breast cancer on Arimidex.    2. MEN-1 syndrome with persistent hypercalcemia.    3. 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 Removal of fifth parathyroid in 8/09, benign pathology.    5. Mild elevation of chromogranin and gastrin post pancreatectomy of uncertain etiology, probably related to PPIs.    6. Negative MRI of the abdomen 11/2014.    7. Marked gastrin  elevation, repeat in our office down to 170 which is stable.    8. Palpable left axillary lymph node, evaluation planned.  Mass aspirated and felt to be a benign cyst  9. Reexploration for elevated PTH with no definite parathyroid tissue in 10/2015.                    10.  Osteoporosis on Prolia       History of Present Illness  patient is a 72-year-old lady with a median type I syndrome comes in today for routine follow-up.  Her calcium is 11 today which is more than her usual level and a kidney function is gradually worsening probably from the persistent hypercalcemia and her use of nonsteroidals which I have discouraged.  Her anemia has resolved with iron infusion in her ferritin is pending today  She continues of fatigue and difficulty controlling her blood sugars and has not yet received her continuous glucose monitoring device because of insurance issues.  She continues of back pain and I think she may have some new compression fractures from osteoporosis although no testing has been done and I told her to call if the pain worsens and will do some plain x-rays to evaluate this.  Her bone density issues I think need to be addressed by her endocrinologist and this is not related to her malignancies    At this point she is 9 years out from her pancreatic neoplasm and I don't think  imaging is needed although I did warn her that  low-grade neuroendocrine carcinoma skin recurrence many years later    PAST MEDICAL HISTORY:  Significant for MEN-1 syndrome with hyperparathyroidism for which she had a subtotal parathyroidectomy in . She has had degenerative disks and had herniated disk operated on in . She denies hypertension, diabetes, peptic ulcer disease, heart attack, stroke, hepatitis, thyroid problems, anemia or rheumatic fever. Mild osteopenia. She is intolerant of bisphosphona t es and calcium. She is -1, para-1, first childbirth was age 28. Menarche was at age 13. Menopause in her early 50s. She did not take hormone therapy. The patient has persistent elevation of her calcium despite parathyroidectomy in the 11 range .   Active Ambulatory Problems     Diagnosis Date Noted   • Primary pancreatic neuroendocrine tumor 2016   • MEN 1 (multiple endocrine neoplasia) 2016   • Malignant neoplasm of upper-outer quadrant of right female breast 2016   • Osteoporosis 2016   • Iron deficiency 10/12/2016     Resolved Ambulatory Problems     Diagnosis Date Noted   • No Resolved Ambulatory Problems     Past Medical History:   Diagnosis Date   • Cancer    • H/O Endocrine tumors    • Hyperparathyroidism    • Intolerance of oral bisphosphonate therapy    • MEN 1 syndrome    • Osteopenia      Past Surgical History:   Procedure Laterality Date   • PARATHYROIDECTOMY      Subtotal   • SPINE SURGERY      Degenerative disks and herniated disks    bilateral mastectomies  Whipple procedure    ONCOLOGIC HISTORY:   #1: The patient had an in situ carcinoma of the uterus and had a hysterectomy in  with no radiation and her ovaries were left in place.     #2: Incidentally noted mammographic abnormality of the right breast at 9 o'clock measuring 15 mm and at 1 o'clock measuring 9 mm. Biopsy of the 1 o'clock lesion was benign. The 9 o' clock lesion showed a low grade ductal carcinoma measuring 1 cm in greatest dimension, ER/MN positive, HER/2 negative, Ki-67 nae vated at 23%. The patient underwent bilateral  mastectomies and mastectomy specimen showed a 1.4 cm residual breast cancer, grade 2 measuring 14 mm with no angiolymphatic invasion and clear margins, sentinel node was negative for malignancy. The invasive carcinoma had mixed duct and lobular features. Left breast was benign.   Onco type DX score showed a 7 which is low risk. Treatment with aromatase inhibitor plus Reclast is planned unless bone density shows marked worsening in which case we will give tamoxifen for two years and three years of an AI.   The patient's bone density shows osteopenia. The patient is on Arimidex with plans to start Reclast as she is intolerant to oral bisphosphonates.     #3 Patient had multiple pancreatic tumors which were endocrin e tumors, nonsecretory apparently per her history and had a total pancreatectomy and pancreaticoduodenectomy with hepatojejunostomy, cholecystectomy, splenectomy in December and retroperitoneal lymph node dissection with finding of microscopic metastatic disease in 1 of 26 lymph nodes.   No date on adjuvant treatment was available in the setting and none was given.   Gastrin level went up to 314 in March, back down to 124 in May of 2010. No treatment given. Chromogranin A was up to 18 in March, down to 14 in May, no treatment given.   Chromogranin A up to 28 on 6/23/10. Octreotide scan planned in early 2011.   CT scans chest, abdomen and pelvis 03/28/11 shows no evidence of recurrent disease. Chromogranin is 26, gastrin 192, a little higher than October but the same as last spring and lack of upward trend is reassuring.   CT scans dated 10/10/2011 showed no evidence of recurrent disease. Stable cavernous hemangioma in the liver. Chromogranin level up to 100 on 09/28/2011 which is up from 39 in June but thi s is a new lab and we are waiting to see if this is a trend or a new baseline.   The patient was seen on 05/12/2012 with no complaints at four years on Arimidex. Chromogranin remains in the 100s. Gastrin  is in the 190 range with negative CT scan.   The patient was seen on 1-08-13, doing fairly well with stable gastrin and chromogranin level of 90. Prolia was given in May but will not be renewed because she does not have osteoporosis but has very severe osteopenia.     The patient stopped Arimidex in July 2013, went on Evista for bone density issues. Chromogranin levels remain stable, but I do not have the levels from Dr. Bell' s office as of 12/2013. Annual followup to be continued with MRI of the liver at her next visit in one year.   Patient seen on 11/25/2014 and MRI of the abdomen and liver is unremarkable except for one new cyst in the liver and increasing number of renal cysts which are chronic and decrease in size of hemangioma in the liver that was noted previo usly. Chromogranin and gastrin levels remains stable in the endocrinologist' s office and I think these elevations are due to her PPIs. Calcium levels is mildly elevated and the parathyroid cannot be located and is being watched. Prolia to be given based on her bone density testing, which is pending.   The patient was seen on 06/16/2015 with marked elevation of chromogranin to 1600 (upper limit of normal 100 in new lab). Octreotide scan was done and negative. Repeat chromogranin testing was planned and if this is still elevated in our lab we will do an MRI of the abdomen to further evaluate and probably an upper endoscopy would be indicated to look for small bowel tumor chromogranin level  160 repeat.   4/17patient is a 71-year-old white female with MEN 1 syndrome with multiple parathyroid adenomas pancreatic neuroendocrine carcinoma producing gastrin and a stage I breast cancer treated with 5 years of adjuvant Arimidex and bilateral mastectomies.  She is here for routine follow-up and to continue her prolia treatments for her osteoporosis     Since I saw her last she has had no new complaints except for some abdominal discomfort which resolved.  We  did CAT scans for review this and I called her with the results which essentially showed no abnormalities which were new except for multiple cysts in her kidneys and liver which were stable.her lung nodules of been stable with a repeat scan in 9-12 months is recommended    She did not make her genetic counseling appointment which we had done because of a maternal history of breast and ovarian cancer which we felt was distinct from her MEN  1 syndrome and she does not want to do this at this time.  CAT scan of the chest shows stability of the pulmonary nodules suggesting benign etiology.  We wanted to follow-up on the lung nodule seen on her scan last year some nodules that been present since  and others were new.  Her calcium remains high in we are unable to find her remaining parathyroid surgeon no surgery is planned.    Chromogranin and gastrin levels of been drawn today and are pending, the B12 was normal and the ferritin is pending.      SOCIAL HISTORY: She is  and lives with her . She works in an office. She does not smoke or drink. She has no risk factors for HIV.     FAMILY HISTORY: Father  at 71 of complications of Zollinger-Reed syndrome and kidney failure. Mother  at 64 of ovarian cancer metastatic to the colon. She has three half-brothers, one half-sister from her father. No ne of them have MEN-1 syndrome. She has a maternal grandmother who had probable breast cancer in her 50s and maternal great grandmother who  of breast cancer in her 50s.     Review of Systems   A comprehensive 14 point review of systems was performed and was negative except as mentioned.    Medications:  The current medication list was reviewed in the EMR    ALLERGIES:    Allergies   Allergen Reactions   • Risedronate Sodium Other (See Comments)     Headache,GASTRIC REFLUX       Objective      Vitals:    10/16/17 0950   BP: 156/76   Pulse: 77   Resp: 12   Temp: 98 °F (36.7 °C)   TempSrc: Oral   SpO2:  "98%   Weight: 99 lb (44.9 kg)   Height: 60.24\" (153 cm)   PainSc:   2   PainLoc: Abdomen     Current Status 10/16/2017   ECOG score 0       Physical Exam    GENERAL:  Well-developed, well-nourished in no acute distress.   SKIN:  Warm, dry without rashes, purpura or petechiae.  HEAD:  Normocephalic.  EYES:  Pupils equal, round and reactive to light.  EOMs intact.  Conjunctivae normal.  EARS:  Hearing intact.  NOSE:  Septum midline.  No excoriations or nasal discharge.  MOUTH:  Tongue is well-papillated; no stomatitis or ulcers.  Lips normal.  THROAT:  Oropharynx without lesions or exudates.  NECK:  Supple with good range of motion; no thyromegaly or masses, no JVD.  LYMPHATICS:  No cervical, supraclavicular, axillary or inguinal adenopathy.  CHEST:  Lungs clear to percussion and auscultation. Good airflow.  BREASTS: Bilateral chest wall are benign with no masses   CARDIAC:  Regular rate and rhythm without murmurs, rubs or gallops. Normal S1,S2.  ABDOMEN:  Soft, nontender with no organomegaly or masses.  EXTREMITIES:  No clubbing, cyanosis or edema.  NEUROLOGICAL:  Cranial Nerves II-XII grossly intact.  No focal neurological deficits.  PSYCHIATRIC:  Normal affect and mood.        RECENT LABS:  Hematology WBC   Date Value Ref Range Status   10/16/2017 5.22 4.50 - 10.70 10*3/mm3 Final     RBC   Date Value Ref Range Status   10/16/2017 4.20 3.90 - 5.20 10*6/mm3 Final     Hemoglobin   Date Value Ref Range Status   10/16/2017 13.7 11.9 - 15.5 g/dL Final     Hematocrit   Date Value Ref Range Status   10/16/2017 41.6 35.6 - 45.5 % Final     Platelets   Date Value Ref Range Status   10/16/2017 366 140 - 500 10*3/mm3 Final       Calcium 11.0 creatinine 1.03      Assessment/Plan   1. T 1cN 0  appear positive HER-2 negative left breast cancer treated with bilateral mastectomies 5 years of an  aromatase inhibitors with an Oncotype score of 7   2.MEN1 syndrome post parathyroidectomy with persistent elevated calcium and inability " to find her remaining parathyroid adenoma   3.pancreatic neuroendocrine carcinoma post Whipple procedure  chromogranin and gastrin levels being followed   4.osteoporosis on Prolia   5.pulmonary nodules?  Granuloma -stable on CAT scan for 1. yrs  6.remote history of uterine cancer post hysterectomy ovaries still in place -mother  with ovarian cancer but she has declined genetic testing for now  7.  Anemia multifactorial with iron deficiency in the past and chronic renal insufficiency stage III-low ferritin consistent with iron deficiency IV iron planned    Plan   1.Endocrinology to follow osteoporosis   2. she will return in 6 months with a chromogranin level and gastrin   3.  Referral to nephrology for elevated creatinine and persistent hypercalcemia  4 immunofixation drawn today.             10/16/2017      CC:

## 2017-11-22 ENCOUNTER — TELEPHONE (OUTPATIENT)
Dept: ONCOLOGY | Facility: HOSPITAL | Age: 72
End: 2017-11-22

## 2017-11-22 NOTE — TELEPHONE ENCOUNTER
For review when you return next week:    1. Patient called to see if you had a recommendation for an MD for her son to see in [facility] or [facility]. She said she might have mentioned it to you. He has hypercalcemia.     2. Also, pt says she has pain in her rt side of her jaw the past month or so. It feels like a nerve pain? She has seen PCP and dentist about this but they don't know why she is having this pain. She has seen dentis and oral surgeon several times and had a top molar removed. They know patient is on prolia and she said they did do a panoramic xray and did not see anything. I reviewed this with Dr Mir (#2) and since xray was already been done he would refer to you to review next week.  Pt wants to know if you recommend her to see a ENT or someone else for this?   Thanks!

## 2017-11-30 ENCOUNTER — TELEPHONE (OUTPATIENT)
Dept: ONCOLOGY | Facility: HOSPITAL | Age: 72
End: 2017-11-30

## 2017-11-30 NOTE — TELEPHONE ENCOUNTER
Patient called again asking for recommendation for her son to see someone in [place] for hypercalcemia. Msg sent to .

## 2017-12-01 ENCOUNTER — TELEPHONE (OUTPATIENT)
Dept: ONCOLOGY | Facility: HOSPITAL | Age: 72
End: 2017-12-01

## 2017-12-01 NOTE — TELEPHONE ENCOUNTER
Called and notified patient. She v/u     ----- Message from Garfield Montalvo MD sent at 12/1/2017  8:23 AM EST -----  Regarding: FW: Referral Request  Contact: [phone]  Dr Boyce or Luis Alberto Davis (Karen) at [facility]  Are Endocrinil[facility]ists- please call pt and give her the names    gj  ----- Message -----     From: Adriana Lunsford RN     Sent: 12/1/2017   7:57 AM       To: Garfield Montalvo MD  Subject: FW: Referral Request                             Please see staff message that I sent on patient and advise if possible. Thanks!     ----- Message -----     From: Lily Tong     Sent: 11/30/2017   7:21 PM       To: [facility]  Subject: Referral Request                                 Hi, Dr Montalvo,    I've left a couple of messages and know you are busy, but I seem to remember that you told me at one time you could recommend someone in [place] for my son.  He just finally had labs run and his calcium is 11.9.   His doctor has him scheduled for a parathyroid scan Tuesday  in [place] but he isn't comfortable with that doctor's knowledge of MEN1.  I asked him about his PTH and he doesn't think they ran it.  Is there anyone you can recommend to us?    Thanks for any help you can give us.    Haley Tong

## 2018-04-16 ENCOUNTER — LAB (OUTPATIENT)
Dept: OTHER | Facility: HOSPITAL | Age: 73
End: 2018-04-16

## 2018-04-16 ENCOUNTER — OFFICE VISIT (OUTPATIENT)
Dept: ONCOLOGY | Facility: CLINIC | Age: 73
End: 2018-04-16

## 2018-04-16 VITALS
SYSTOLIC BLOOD PRESSURE: 148 MMHG | TEMPERATURE: 97.7 F | BODY MASS INDEX: 18.65 KG/M2 | HEIGHT: 60 IN | RESPIRATION RATE: 16 BRPM | WEIGHT: 95 LBS | HEART RATE: 82 BPM | OXYGEN SATURATION: 100 % | DIASTOLIC BLOOD PRESSURE: 77 MMHG

## 2018-04-16 DIAGNOSIS — Z17.0 MALIGNANT NEOPLASM OF UPPER-OUTER QUADRANT OF RIGHT BREAST IN FEMALE, ESTROGEN RECEPTOR POSITIVE (HCC): Primary | ICD-10-CM

## 2018-04-16 DIAGNOSIS — D3A.8 PRIMARY PANCREATIC NEUROENDOCRINE TUMOR: ICD-10-CM

## 2018-04-16 DIAGNOSIS — M81.8 OTHER OSTEOPOROSIS WITHOUT CURRENT PATHOLOGICAL FRACTURE: ICD-10-CM

## 2018-04-16 DIAGNOSIS — E31.21 MEN 1 (MULTIPLE ENDOCRINE NEOPLASIA) (HCC): ICD-10-CM

## 2018-04-16 DIAGNOSIS — E61.1 IRON DEFICIENCY: ICD-10-CM

## 2018-04-16 DIAGNOSIS — Z17.0 MALIGNANT NEOPLASM OF UPPER-OUTER QUADRANT OF RIGHT BREAST IN FEMALE, ESTROGEN RECEPTOR POSITIVE (HCC): ICD-10-CM

## 2018-04-16 DIAGNOSIS — C7A.098: ICD-10-CM

## 2018-04-16 DIAGNOSIS — C50.411 MALIGNANT NEOPLASM OF UPPER-OUTER QUADRANT OF RIGHT BREAST IN FEMALE, ESTROGEN RECEPTOR POSITIVE (HCC): Primary | ICD-10-CM

## 2018-04-16 DIAGNOSIS — M81.6 LOCALIZED OSTEOPOROSIS OF LEQUESNE: ICD-10-CM

## 2018-04-16 DIAGNOSIS — C50.411 MALIGNANT NEOPLASM OF UPPER-OUTER QUADRANT OF RIGHT BREAST IN FEMALE, ESTROGEN RECEPTOR POSITIVE (HCC): ICD-10-CM

## 2018-04-16 LAB
ALBUMIN SERPL-MCNC: 4.7 G/DL (ref 3.5–5.2)
ALBUMIN/GLOB SERPL: 1.3 G/DL
ALP SERPL-CCNC: 114 U/L (ref 39–117)
ALT SERPL W P-5'-P-CCNC: 40 U/L (ref 1–33)
ANION GAP SERPL CALCULATED.3IONS-SCNC: 11.5 MMOL/L
AST SERPL-CCNC: 46 U/L (ref 1–32)
BASOPHILS # BLD AUTO: 0.06 10*3/MM3 (ref 0–0.2)
BASOPHILS NFR BLD AUTO: 1 % (ref 0–1.5)
BILIRUB SERPL-MCNC: 0.4 MG/DL (ref 0.1–1.2)
BUN BLD-MCNC: 12 MG/DL (ref 8–23)
BUN/CREAT SERPL: 11.3 (ref 7–25)
CALCIUM SPEC-SCNC: 11.9 MG/DL (ref 8.6–10.5)
CHLORIDE SERPL-SCNC: 100 MMOL/L (ref 98–107)
CO2 SERPL-SCNC: 27.5 MMOL/L (ref 22–29)
CREAT BLD-MCNC: 1.06 MG/DL (ref 0.57–1)
DEPRECATED RDW RBC AUTO: 55.3 FL (ref 37–54)
EOSINOPHIL # BLD AUTO: 0.03 10*3/MM3 (ref 0–0.7)
EOSINOPHIL NFR BLD AUTO: 0.5 % (ref 0.3–6.2)
ERYTHROCYTE [DISTWIDTH] IN BLOOD BY AUTOMATED COUNT: 14.7 % (ref 11.7–13)
FERRITIN SERPL-MCNC: 156.3 NG/ML (ref 13–150)
GFR SERPL CREATININE-BSD FRML MDRD: 51 ML/MIN/1.73
GLOBULIN UR ELPH-MCNC: 3.5 GM/DL
GLUCOSE BLD-MCNC: 186 MG/DL (ref 65–99)
HCT VFR BLD AUTO: 43.3 % (ref 35.6–45.5)
HGB BLD-MCNC: 14 G/DL (ref 11.9–15.5)
IMM GRANULOCYTES # BLD: 0.01 10*3/MM3 (ref 0–0.03)
IMM GRANULOCYTES NFR BLD: 0.2 % (ref 0–0.5)
LYMPHOCYTES # BLD AUTO: 1.52 10*3/MM3 (ref 0.9–4.8)
LYMPHOCYTES NFR BLD AUTO: 25.5 % (ref 19.6–45.3)
MCH RBC QN AUTO: 32.3 PG (ref 26.9–32)
MCHC RBC AUTO-ENTMCNC: 32.3 G/DL (ref 32.4–36.3)
MCV RBC AUTO: 100 FL (ref 80.5–98.2)
MONOCYTES # BLD AUTO: 0.53 10*3/MM3 (ref 0.2–1.2)
MONOCYTES NFR BLD AUTO: 8.9 % (ref 5–12)
NEUTROPHILS # BLD AUTO: 3.82 10*3/MM3 (ref 1.9–8.1)
NEUTROPHILS NFR BLD AUTO: 63.9 % (ref 42.7–76)
NRBC BLD MANUAL-RTO: 0 /100 WBC (ref 0–0)
PLATELET # BLD AUTO: 402 10*3/MM3 (ref 140–500)
PMV BLD AUTO: 10.7 FL (ref 6–12)
POTASSIUM BLD-SCNC: 4.6 MMOL/L (ref 3.5–5.2)
PROT SERPL-MCNC: 8.2 G/DL (ref 6–8.5)
RBC # BLD AUTO: 4.33 10*6/MM3 (ref 3.9–5.2)
SODIUM BLD-SCNC: 139 MMOL/L (ref 136–145)
WBC NRBC COR # BLD: 5.97 10*3/MM3 (ref 4.5–10.7)

## 2018-04-16 PROCEDURE — 80053 COMPREHEN METABOLIC PANEL: CPT | Performed by: INTERNAL MEDICINE

## 2018-04-16 PROCEDURE — 82941 ASSAY OF GASTRIN: CPT | Performed by: INTERNAL MEDICINE

## 2018-04-16 PROCEDURE — 86316 IMMUNOASSAY TUMOR OTHER: CPT | Performed by: INTERNAL MEDICINE

## 2018-04-16 PROCEDURE — 82728 ASSAY OF FERRITIN: CPT | Performed by: INTERNAL MEDICINE

## 2018-04-16 PROCEDURE — 85025 COMPLETE CBC W/AUTO DIFF WBC: CPT | Performed by: INTERNAL MEDICINE

## 2018-04-16 PROCEDURE — 36415 COLL VENOUS BLD VENIPUNCTURE: CPT

## 2018-04-16 PROCEDURE — 99213 OFFICE O/P EST LOW 20 MIN: CPT | Performed by: INTERNAL MEDICINE

## 2018-04-16 RX ORDER — INSULIN LISPRO 100 [IU]/ML
INJECTION, SOLUTION INTRAVENOUS; SUBCUTANEOUS
COMMUNITY
Start: 2018-03-09 | End: 2022-02-20

## 2018-04-16 RX ORDER — OSELTAMIVIR PHOSPHATE 75 MG/1
CAPSULE ORAL
COMMUNITY
Start: 2018-02-27 | End: 2018-04-16

## 2018-04-16 RX ORDER — INSULIN GLARGINE 100 [IU]/ML
INJECTION, SOLUTION SUBCUTANEOUS
COMMUNITY
Start: 2018-03-21 | End: 2022-02-20

## 2018-04-16 RX ORDER — SUCRALFATE 1 G/1
1 TABLET ORAL DAILY
COMMUNITY
Start: 2018-03-22

## 2018-04-16 RX ORDER — AMLODIPINE BESYLATE 5 MG/1
TABLET ORAL
COMMUNITY
Start: 2018-03-07 | End: 2018-04-16

## 2018-04-16 NOTE — PROGRESS NOTES
Subjective    REASONS FOR FOLLOWUP:    1. Stage I breast cancer on Arimidex.    2. MEN-1 syndrome with persistent hypercalcemia.    3. 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 Removal of fifth parathyroid in 8/09, benign pathology.    5. Mild elevation of chromogranin and gastrin post pancreatectomy of uncertain etiology, probably related to PPIs.    6. Negative MRI of the abdomen 11/2014.    7. Marked gastrin  elevation, repeat in our office down to 170 which is stable.    8. Palpable left axillary lymph node, evaluation planned.  Mass aspirated and felt to be a benign cyst  9. Reexploration for elevated PTH with no definite parathyroid tissue in 10/2015.                    10.  Osteoporosis on Prolia       History of Present Illness  patient is a 72-year-old lady with a MEN type I syndrome comes in today for routine follow-up.  Her calcium is 11.9 today which is more than her usual level and a kidney function is gradually worsening probably from the persistent hypercalcemia .  She is following up with Dr. Vences who's her nephrologist and I'm going to leave workup to him  Her anemia has resolved with iron infusion in her ferritin is 156  She continues of fatigue and difficulty controlling her blood sugars and has not yet received her continuous glucose monitoring device because of insurance issues.  She continues of back pain and I think she may have some new compression fractures from osteoporosis although no testing has been done and I told her to call if the pain worsens and will do some plain x-rays to evaluate this.  Her bone density issues I think need to be addressed by her endocrinologist and this is not related to her malignancies    At this point she is 9 years out from her pancreatic neoplasm and I don't think  imaging is needed although I did warn her that  low-grade neuroendocrine carcinoma skin recurrence many years  later    PAST MEDICAL HISTORY: Significant for MEN-1 syndrome with hyperparathyroidism for which she had a subtotal parathyroidectomy in . She has had degenerative disks and had herniated disk operated on in . She denies hypertension, diabetes, peptic ulcer disease, heart attack, stroke, hepatitis, thyroid problems, anemia or rheumatic fever. Mild osteopenia. She is intolerant of bisphosphona t es and calcium. She is -1, para-1, first childbirth was age 28. Menarche was at age 13. Menopause in her early 50s. She did not take hormone therapy. The patient has persistent elevation of her calcium despite parathyroidectomy in the 11 range .   Active Ambulatory Problems     Diagnosis Date Noted   • Primary pancreatic neuroendocrine tumor 2016   • MEN 1 (multiple endocrine neoplasia) 2016   • Malignant neoplasm of upper-outer quadrant of right female breast 2016   • Osteoporosis 2016   • Iron deficiency 10/12/2016     Resolved Ambulatory Problems     Diagnosis Date Noted   • No Resolved Ambulatory Problems     Past Medical History:   Diagnosis Date   • Cancer    • H/O Endocrine tumors    • Hyperparathyroidism    • Intolerance of oral bisphosphonate therapy    • MEN 1 syndrome    • Osteopenia      Past Surgical History:   Procedure Laterality Date   • PARATHYROIDECTOMY      Subtotal   • SPINE SURGERY      Degenerative disks and herniated disks    bilateral mastectomies  Whipple procedure    ONCOLOGIC HISTORY:   #1: The patient had an in situ carcinoma of the uterus and had a hysterectomy in  with no radiation and her ovaries were left in place.     #2: Incidentally noted mammographic abnormality of the right breast at 9 o'clock measuring 15 mm and at 1 o'clock measuring 9 mm. Biopsy of the 1 o'clock lesion was benign. The 9 o' clock lesion showed a low grade ductal carcinoma measuring 1 cm in greatest dimension, ER/ND positive, HER/2 negative, Ki-67 nae vated at 23%. The  patient underwent bilateral mastectomies and mastectomy specimen showed a 1.4 cm residual breast cancer, grade 2 measuring 14 mm with no angiolymphatic invasion and clear margins, sentinel node was negative for malignancy. The invasive carcinoma had mixed duct and lobular features. Left breast was benign.   Onco type DX score showed a 7 which is low risk. Treatment with aromatase inhibitor plus Reclast is planned unless bone density shows marked worsening in which case we will give tamoxifen for two years and three years of an AI.   The patient's bone density shows osteopenia. The patient is on Arimidex with plans to start Reclast as she is intolerant to oral bisphosphonates.     #3 Patient had multiple pancreatic tumors which were endocrin e tumors, nonsecretory apparently per her history and had a total pancreatectomy and pancreaticoduodenectomy with hepatojejunostomy, cholecystectomy, splenectomy in December and retroperitoneal lymph node dissection with finding of microscopic metastatic disease in 1 of 26 lymph nodes.   No date on adjuvant treatment was available in the setting and none was given.   Gastrin level went up to 314 in March, back down to 124 in May of 2010. No treatment given. Chromogranin A was up to 18 in March, down to 14 in May, no treatment given.   Chromogranin A up to 28 on 6/23/10. Octreotide scan planned in early 2011.   CT scans chest, abdomen and pelvis 03/28/11 shows no evidence of recurrent disease. Chromogranin is 26, gastrin 192, a little higher than October but the same as last spring and lack of upward trend is reassuring.   CT scans dated 10/10/2011 showed no evidence of recurrent disease. Stable cavernous hemangioma in the liver. Chromogranin level up to 100 on 09/28/2011 which is up from 39 in June but thi s is a new lab and we are waiting to see if this is a trend or a new baseline.   The patient was seen on 05/12/2012 with no complaints at four years on Arimidex. Chromogranin  remains in the 100s. Gastrin is in the 190 range with negative CT scan.   The patient was seen on 1-08-13, doing fairly well with stable gastrin and chromogranin level of 90. Prolia was given in May but will not be renewed because she does not have osteoporosis but has very severe osteopenia.     The patient stopped Arimidex in July 2013, went on Evista for bone density issues. Chromogranin levels remain stable, but I do not have the levels from Dr. Bell' s office as of 12/2013. Annual followup to be continued with MRI of the liver at her next visit in one year.   Patient seen on 11/25/2014 and MRI of the abdomen and liver is unremarkable except for one new cyst in the liver and increasing number of renal cysts which are chronic and decrease in size of hemangioma in the liver that was noted previo usly. Chromogranin and gastrin levels remains stable in the endocrinologist' s office and I think these elevations are due to her PPIs. Calcium levels is mildly elevated and the parathyroid cannot be located and is being watched. Prolia to be given based on her bone density testing, which is pending.   The patient was seen on 06/16/2015 with marked elevation of chromogranin to 1600 (upper limit of normal 100 in new lab). Octreotide scan was done and negative. Repeat chromogranin testing was planned and if this is still elevated in our lab we will do an MRI of the abdomen to further evaluate and probably an upper endoscopy would be indicated to look for small bowel tumor chromogranin level  160 repeat.   4/17patient is a 71-year-old white female with MEN 1 syndrome with multiple parathyroid adenomas pancreatic neuroendocrine carcinoma producing gastrin and a stage I breast cancer treated with 5 years of adjuvant Arimidex and bilateral mastectomies.  She is here for routine follow-up and to continue her prolia treatments for her osteoporosis     Since I saw her last she has had no new complaints except for some abdominal  discomfort which resolved.  We did CAT scans for review this and I called her with the results which essentially showed no abnormalities which were new except for multiple cysts in her kidneys and liver which were stable.her lung nodules of been stable with a repeat scan in 9-12 months is recommended    She did not make her genetic counseling appointment which we had done because of a maternal history of breast and ovarian cancer which we felt was distinct from her MEN  1 syndrome and she does not want to do this at this time.  CAT scan of the chest shows stability of the pulmonary nodules suggesting benign etiology.  We wanted to follow-up on the lung nodule seen on her scan last year some nodules that been present since  and others were new.  Her calcium remains high in we are unable to find her remaining parathyroid surgeon no surgery is planned.    Chromogranin and gastrin levels of been drawn today and are pending, the B12 was normal and the ferritin is pending.      SOCIAL HISTORY: She is  and lives with her . She works in an office. She does not smoke or drink. She has no risk factors for HIV.     FAMILY HISTORY: Father  at 71 of complications of Zollinger-Reed syndrome and kidney failure. Mother  at 64 of ovarian cancer metastatic to the colon. She has three half-brothers, one half-sister from her father. No ne of them have MEN-1 syndrome. She has a maternal grandmother who had probable breast cancer in her 50s and maternal great grandmother who  of breast cancer in her 50s.     Review of Systems   A comprehensive 14 point review of systems was performed and was negative except as mentioned.    Medications:  The current medication list was reviewed in the EMR    ALLERGIES:    Allergies   Allergen Reactions   • Risedronate Sodium Other (See Comments)     Headache,GASTRIC REFLUX       Objective      Vitals:    18 1302   BP: 148/77   Pulse: 82   Resp: 16   Temp: 97.7 °F  "(36.5 °C)   SpO2: 100%   Weight: 43.1 kg (95 lb)   Height: 153 cm (60.24\")  Comment: new ht   PainSc: 0-No pain     Current Status 4/16/2018   ECOG score 0       Physical Exam    GENERAL:  Well-developed, well-nourished in no acute distress.   SKIN:  Warm, dry without rashes, purpura or petechiae.  HEAD:  Normocephalic.  EYES:  Pupils equal, round and reactive to light.  EOMs intact.  Conjunctivae normal.  EARS:  Hearing intact.  NOSE:  Septum midline.  No excoriations or nasal discharge.  MOUTH:  Tongue is well-papillated; no stomatitis or ulcers.  Lips normal.  THROAT:  Oropharynx without lesions or exudates.  NECK:  Supple with good range of motion; no thyromegaly or masses, no JVD.  LYMPHATICS:  No cervical, supraclavicular, axillary or inguinal adenopathy.  CHEST:  Lungs clear to percussion and auscultation. Good airflow.  BREASTS: Bilateral chest wall are benign with no masses   CARDIAC:  Regular rate and rhythm without murmurs, rubs or gallops. Normal S1,S2.  ABDOMEN:  Soft, nontender with no organomegaly or masses.  EXTREMITIES:  No clubbing, cyanosis or edema.  NEUROLOGICAL:  Cranial Nerves II-XII grossly intact.  No focal neurological deficits.  PSYCHIATRIC:  Normal affect and mood.        RECENT LABS:  Hematology WBC   Date Value Ref Range Status   04/16/2018 5.97 4.50 - 10.70 10*3/mm3 Final     RBC   Date Value Ref Range Status   04/16/2018 4.33 3.90 - 5.20 10*6/mm3 Final     Hemoglobin   Date Value Ref Range Status   04/16/2018 14.0 11.9 - 15.5 g/dL Final     Hematocrit   Date Value Ref Range Status   04/16/2018 43.3 35.6 - 45.5 % Final     Platelets   Date Value Ref Range Status   04/16/2018 402 140 - 500 10*3/mm3 Final       Calcium 11.9 creatinine 1.03      Assessment/Plan   1. T 1cN 0  appear positive HER-2 negative left breast cancer treated with bilateral mastectomies 5 years of an  aromatase inhibitors with an Oncotype score of 7   2.MEN1 syndrome post parathyroidectomy with persistent elevated " calcium and inability to find her remaining parathyroid adenoma   3.pancreatic neuroendocrine carcinoma post Whipple procedure  chromogranin and gastrin levels being followed   4.osteoporosis on Prolia   5.pulmonary nodules?  Granuloma -stable on CAT scan for 1. yrs  6.remote history of uterine cancer post hysterectomy ovaries still in place -mother  with ovarian cancer but she has declined genetic testing for now  7.  Anemia multifactorial with iron deficiency in the past and chronic renal insufficiency stage III-low ferritin consistent with iron deficiency IV iron planned    Plan   1.Endocrinology to follow osteoporosis -DEXA scan ordered?  [facility]  2. she will return in 6 months with a chromogranin level and gastrin   3.  Elevated calcium and creatinine to be followed by nephrology      2018      CC:

## 2018-04-19 LAB
CGA SERPL-SCNC: 34 NMOL/L (ref 0–5)
GASTRIN SERPL-MCNC: 272 PG/ML (ref 0–115)

## 2018-08-27 ENCOUNTER — HOSPITAL ENCOUNTER (EMERGENCY)
Facility: HOSPITAL | Age: 73
Discharge: HOME | End: 2018-08-27
Attending: EMERGENCY MEDICINE | Admitting: EMERGENCY MEDICINE

## 2018-08-27 ENCOUNTER — APPOINTMENT (OUTPATIENT)
Dept: ULTRASOUND IMAGING | Facility: HOSPITAL | Age: 73
End: 2018-08-27

## 2018-08-27 VITALS
TEMPERATURE: 98.1 F | WEIGHT: 95 LBS | SYSTOLIC BLOOD PRESSURE: 163 MMHG | BODY MASS INDEX: 18.65 KG/M2 | OXYGEN SATURATION: 99 % | HEIGHT: 60 IN | HEART RATE: 84 BPM | RESPIRATION RATE: 16 BRPM | DIASTOLIC BLOOD PRESSURE: 84 MMHG

## 2018-08-27 DIAGNOSIS — M79.662 PAIN OF LEFT CALF: Primary | ICD-10-CM

## 2018-08-27 PROCEDURE — 99282 EMERGENCY DEPT VISIT SF MDM: CPT | Performed by: EMERGENCY MEDICINE

## 2018-08-27 PROCEDURE — 99283 EMERGENCY DEPT VISIT LOW MDM: CPT

## 2018-08-27 PROCEDURE — 93971 EXTREMITY STUDY: CPT

## 2018-09-24 ENCOUNTER — HOSPITAL ENCOUNTER (OUTPATIENT)
Dept: BONE DENSITY | Facility: HOSPITAL | Age: 73
Discharge: HOME | End: 2018-09-24
Attending: INTERNAL MEDICINE | Admitting: INTERNAL MEDICINE

## 2018-09-24 DIAGNOSIS — E31.21 MEN 1 (MULTIPLE ENDOCRINE NEOPLASIA) (HCC): ICD-10-CM

## 2018-09-24 DIAGNOSIS — Z17.0 MALIGNANT NEOPLASM OF UPPER-OUTER QUADRANT OF RIGHT BREAST IN FEMALE, ESTROGEN RECEPTOR POSITIVE (HCC): ICD-10-CM

## 2018-09-24 DIAGNOSIS — C50.411 MALIGNANT NEOPLASM OF UPPER-OUTER QUADRANT OF RIGHT BREAST IN FEMALE, ESTROGEN RECEPTOR POSITIVE (HCC): ICD-10-CM

## 2018-09-24 DIAGNOSIS — M81.6 LOCALIZED OSTEOPOROSIS OF LEQUESNE: ICD-10-CM

## 2018-09-24 DIAGNOSIS — D3A.8 PRIMARY PANCREATIC NEUROENDOCRINE TUMOR: ICD-10-CM

## 2018-09-24 PROCEDURE — 77080 DXA BONE DENSITY AXIAL: CPT

## 2018-10-15 ENCOUNTER — LAB (OUTPATIENT)
Dept: OTHER | Facility: HOSPITAL | Age: 73
End: 2018-10-15

## 2018-10-15 ENCOUNTER — OFFICE VISIT (OUTPATIENT)
Dept: ONCOLOGY | Facility: CLINIC | Age: 73
End: 2018-10-15

## 2018-10-15 VITALS
RESPIRATION RATE: 18 BRPM | HEART RATE: 92 BPM | OXYGEN SATURATION: 97 % | SYSTOLIC BLOOD PRESSURE: 138 MMHG | TEMPERATURE: 97.8 F | BODY MASS INDEX: 19.67 KG/M2 | HEIGHT: 60 IN | WEIGHT: 100.2 LBS | DIASTOLIC BLOOD PRESSURE: 83 MMHG

## 2018-10-15 DIAGNOSIS — Z17.0 MALIGNANT NEOPLASM OF UPPER-OUTER QUADRANT OF RIGHT BREAST IN FEMALE, ESTROGEN RECEPTOR POSITIVE (HCC): ICD-10-CM

## 2018-10-15 DIAGNOSIS — E31.21 MEN 1 (MULTIPLE ENDOCRINE NEOPLASIA) (HCC): ICD-10-CM

## 2018-10-15 DIAGNOSIS — C7B.00 SECONDARY CARCINOID TUMOR (HCC): ICD-10-CM

## 2018-10-15 DIAGNOSIS — C50.411 MALIGNANT NEOPLASM OF UPPER-OUTER QUADRANT OF RIGHT BREAST IN FEMALE, ESTROGEN RECEPTOR POSITIVE (HCC): ICD-10-CM

## 2018-10-15 DIAGNOSIS — C7A.098: ICD-10-CM

## 2018-10-15 DIAGNOSIS — D3A.8 PRIMARY PANCREATIC NEUROENDOCRINE TUMOR: ICD-10-CM

## 2018-10-15 DIAGNOSIS — E31.21 MEN 1 (MULTIPLE ENDOCRINE NEOPLASIA) (HCC): Primary | ICD-10-CM

## 2018-10-15 LAB
ALBUMIN SERPL-MCNC: 4.4 G/DL (ref 3.5–5.2)
ALBUMIN/GLOB SERPL: 1.5 G/DL
ALP SERPL-CCNC: 145 U/L (ref 39–117)
ALT SERPL W P-5'-P-CCNC: 36 U/L (ref 1–33)
ANION GAP SERPL CALCULATED.3IONS-SCNC: 8 MMOL/L
AST SERPL-CCNC: 44 U/L (ref 1–32)
BASOPHILS # BLD AUTO: 0.07 10*3/MM3 (ref 0–0.2)
BASOPHILS NFR BLD AUTO: 1.3 % (ref 0–1.5)
BILIRUB SERPL-MCNC: 0.3 MG/DL (ref 0.1–1.2)
BUN BLD-MCNC: 10 MG/DL (ref 8–23)
BUN/CREAT SERPL: 11.2 (ref 7–25)
CALCIUM SPEC-SCNC: 10.9 MG/DL (ref 8.6–10.5)
CHLORIDE SERPL-SCNC: 105 MMOL/L (ref 98–107)
CO2 SERPL-SCNC: 28 MMOL/L (ref 22–29)
CREAT BLD-MCNC: 0.89 MG/DL (ref 0.57–1)
DEPRECATED RDW RBC AUTO: 52.4 FL (ref 37–54)
EOSINOPHIL # BLD AUTO: 0.1 10*3/MM3 (ref 0–0.7)
EOSINOPHIL NFR BLD AUTO: 1.8 % (ref 0.3–6.2)
ERYTHROCYTE [DISTWIDTH] IN BLOOD BY AUTOMATED COUNT: 14.4 % (ref 11.7–13)
GFR SERPL CREATININE-BSD FRML MDRD: 62 ML/MIN/1.73
GLOBULIN UR ELPH-MCNC: 3 GM/DL
GLUCOSE BLD-MCNC: 52 MG/DL (ref 65–99)
HCT VFR BLD AUTO: 41.5 % (ref 35.6–45.5)
HGB BLD-MCNC: 13.4 G/DL (ref 11.9–15.5)
IMM GRANULOCYTES # BLD: 0.01 10*3/MM3 (ref 0–0.03)
IMM GRANULOCYTES NFR BLD: 0.2 % (ref 0–0.5)
LYMPHOCYTES # BLD AUTO: 1.19 10*3/MM3 (ref 0.9–4.8)
LYMPHOCYTES NFR BLD AUTO: 21.9 % (ref 19.6–45.3)
MCH RBC QN AUTO: 31.9 PG (ref 26.9–32)
MCHC RBC AUTO-ENTMCNC: 32.3 G/DL (ref 32.4–36.3)
MCV RBC AUTO: 98.8 FL (ref 80.5–98.2)
MONOCYTES # BLD AUTO: 0.58 10*3/MM3 (ref 0.2–1.2)
MONOCYTES NFR BLD AUTO: 10.7 % (ref 5–12)
NEUTROPHILS # BLD AUTO: 3.49 10*3/MM3 (ref 1.9–8.1)
NEUTROPHILS NFR BLD AUTO: 64.1 % (ref 42.7–76)
NRBC BLD MANUAL-RTO: 0 /100 WBC (ref 0–0)
PLATELET # BLD AUTO: 360 10*3/MM3 (ref 140–500)
PMV BLD AUTO: 10.3 FL (ref 6–12)
POTASSIUM BLD-SCNC: 4.4 MMOL/L (ref 3.5–5.2)
PROT SERPL-MCNC: 7.4 G/DL (ref 6–8.5)
RBC # BLD AUTO: 4.2 10*6/MM3 (ref 3.9–5.2)
SODIUM BLD-SCNC: 141 MMOL/L (ref 136–145)
WBC NRBC COR # BLD: 5.44 10*3/MM3 (ref 4.5–10.7)

## 2018-10-15 PROCEDURE — 36415 COLL VENOUS BLD VENIPUNCTURE: CPT

## 2018-10-15 PROCEDURE — 86316 IMMUNOASSAY TUMOR OTHER: CPT | Performed by: INTERNAL MEDICINE

## 2018-10-15 PROCEDURE — 99213 OFFICE O/P EST LOW 20 MIN: CPT | Performed by: INTERNAL MEDICINE

## 2018-10-15 PROCEDURE — 80053 COMPREHEN METABOLIC PANEL: CPT | Performed by: INTERNAL MEDICINE

## 2018-10-15 PROCEDURE — 85025 COMPLETE CBC W/AUTO DIFF WBC: CPT | Performed by: INTERNAL MEDICINE

## 2018-10-15 NOTE — PROGRESS NOTES
Subjective    REASONS FOR FOLLOWUP:    1. Stage I breast cancer on Arimidex.    2. MEN-1 syndrome with persistent hypercalcemia.    3. 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 Removal of fifth parathyroid in 8/09, benign pathology.    5. Mild elevation of chromogranin and gastrin post pancreatectomy of uncertain etiology, probably related to PPIs.    6. Negative MRI of the abdomen 11/2014.    7. Marked gastrin  elevation, repeat in our office down to 170 which is stable.    8. Palpable left axillary lymph node, evaluation planned.  Mass aspirated and felt to be a benign cyst  9. Reexploration for elevated PTH with no definite parathyroid tissue in 10/2015.                    10.  Osteoporosis on Prolia       History of Present Illness  patient is a 72-year-old lady with a MEN type I syndrome comes in today for routine follow-up.  Her calcium is 10.9 today which is her usual level and a kidney function is better     Her anemia has resolved with iron infusion in her ferritin is 156  She continues of fatigue and difficulty controlling her blood sugars and has not yet received her continuous glucose monitoring device because of insurance issues.  She continues of back pain which is chronic and her bone density today shows osteoporosis in her hips but normal in her spine and I told her to talk to her endocrinologist about Forteo     At this point she is almost 10 years and years out from her pancreatic neoplasm and I don't think  imaging is needed although I did warn her that  low-grade neuroendocrine carcinoma skin recurrence many years later.  We are watching her chromogranin which has been borderline for a while if the levels continue to rise may be scanning with the nuclear scan and referral to Dr. Franko Matthew at   may be indicated      PAST MEDICAL HISTORY: Significant for MEN-1 syndrome with hyperparathyroidism for which she  had a subtotal parathyroidectomy in . She has had degenerative disks and had herniated disk operated on in .  She is intolerant of bisphosphona t es and calcium.   . The patient has persistent elevation of her calcium despite parathyroidectomy in the 11 range .   Active Ambulatory Problems     Diagnosis Date Noted   • Primary pancreatic neuroendocrine tumor 2016   • MEN 1 (multiple endocrine neoplasia) (CMS/HCC) 2016   • Malignant neoplasm of upper-outer quadrant of right female breast (CMS/HCC) 2016   • Osteoporosis 2016   • Iron deficiency 10/12/2016     Resolved Ambulatory Problems     Diagnosis Date Noted   • No Resolved Ambulatory Problems     Past Medical History:   Diagnosis Date   • Breast cancer (CMS/HCC)    • Cancer (CMS/HCC)    • H/O Endocrine tumors    • Hyperparathyroidism (CMS/HCC)    • Intolerance of oral bisphosphonate therapy    • MEN 1 syndrome (CMS/HCC)    • Osteopenia    • Pancreatic cancer (CMS/HCC)      Past Surgical History:   Procedure Laterality Date   • BACK SURGERY      disc repair   • COLONOSCOPY     • HERNIA REPAIR     • HYSTERECTOMY         • MASTECTOMY     • PARATHYROIDECTOMY      Subtotal   • SPINE SURGERY      Degenerative disks and herniated disks   • THYROID SURGERY      bilateral mastectomies  Whipple procedure    ONCOLOGIC HISTORY:   #1: The patient had an in situ carcinoma of the uterus and had a hysterectomy in  with no radiation and her ovaries were left in place. She is -1, para-1, first childbirth was age 28. Menarche was at age 13. Menopause in her early 50s. She did not take hormone therapy    #2: Incidentally noted mammographic abnormality of the right breast at 9 o'clock measuring 15 mm and at 1 o'clock measuring 9 mm. Biopsy of the 1 o'clock lesion was benign. The 9 o' clock lesion showed a low grade ductal carcinoma measuring 1 cm in greatest dimension, ER/WA positive, HER/2 negative, Ki-67 nae vated at  23%. The patient underwent bilateral mastectomies and mastectomy specimen showed a 1.4 cm residual breast cancer, grade 2 measuring 14 mm with no angiolymphatic invasion and clear margins, sentinel node was negative for malignancy. The invasive carcinoma had mixed duct and lobular features. Left breast was benign.   Onco type DX score showed a 7 which is low risk. Treatment with aromatase inhibitor plus Reclast is planned unless bone density shows marked worsening in which case we will give tamoxifen for two years and three years of an AI.   The patient's bone density shows osteopenia. The patient is on Arimidex with plans to start Reclast as she is intolerant to oral bisphosphonates.     #3 Patient had multiple pancreatic tumors which were endocrin e tumors, nonsecretory apparently per her history and had a total pancreatectomy and pancreaticoduodenectomy with hepatojejunostomy, cholecystectomy, splenectomy in December and retroperitoneal lymph node dissection with finding of microscopic metastatic disease in 1 of 26 lymph nodes.   No date on adjuvant treatment was available in the setting and none was given.   Gastrin level went up to 314 in March, back down to 124 in May of 2010. No treatment given. Chromogranin A was up to 18 in March, down to 14 in May, no treatment given.   Chromogranin A up to 28 on 6/23/10. Octreotide scan planned in early 2011.   CT scans chest, abdomen and pelvis 03/28/11 shows no evidence of recurrent disease. Chromogranin is 26, gastrin 192, a little higher than October but the same as last spring and lack of upward trend is reassuring.   CT scans dated 10/10/2011 showed no evidence of recurrent disease. Stable cavernous hemangioma in the liver. Chromogranin level up to 100 on 09/28/2011 which is up from 39 in June but thi s is a new lab and we are waiting to see if this is a trend or a new baseline.   The patient was seen on 05/12/2012 with no complaints at four years on Arimidex.  Chromogranin remains in the 100s. Gastrin is in the 190 range with negative CT scan.   The patient was seen on 1-08-13, doing fairly well with stable gastrin and chromogranin level of 90. Prolia was given in May but will not be renewed because she does not have osteoporosis but has very severe osteopenia.     The patient stopped Arimidex in July 2013, went on Evista for bone density issues. Chromogranin levels remain stable, but I do not have the levels from Dr. Bell' s office as of 12/2013. Annual followup to be continued with MRI of the liver at her next visit in one year.   Patient seen on 11/25/2014 and MRI of the abdomen and liver is unremarkable except for one new cyst in the liver and increasing number of renal cysts which are chronic and decrease in size of hemangioma in the liver that was noted previo usly. Chromogranin and gastrin levels remains stable in the endocrinologist' s office and I think these elevations are due to her PPIs. Calcium levels is mildly elevated and the parathyroid cannot be located and is being watched. Prolia to be given based on her bone density testing, which is pending.   The patient was seen on 06/16/2015 with marked elevation of chromogranin to 1600 (upper limit of normal 100 in new lab). Octreotide scan was done and negative. Repeat chromogranin testing was planned and if this is still elevated in our lab we will do an MRI of the abdomen to further evaluate and probably an upper endoscopy would be indicated to look for small bowel tumor chromogranin level  160 repeat.   4/17patient is a 71-year-old white female with MEN 1 syndrome with multiple parathyroid adenomas pancreatic neuroendocrine carcinoma producing gastrin and a stage I breast cancer treated with 5 years of adjuvant Arimidex and bilateral mastectomies.  She is here for routine follow-up and to continue her prolia treatments for her osteoporosis     Since I saw her last she has had no new complaints except for some  abdominal discomfort which resolved.  We did CAT scans for review this and I called her with the results which essentially showed no abnormalities which were new except for multiple cysts in her kidneys and liver which were stable.her lung nodules of been stable with a repeat scan in 9-12 months is recommended    She did not make her genetic counseling appointment which we had done because of a maternal history of breast and ovarian cancer which we felt was distinct from her MEN  1 syndrome and she does not want to do this at this time.  CAT scan of the chest shows stability of the pulmonary nodules suggesting benign etiology.  We wanted to follow-up on the lung nodule seen on her scan last year some nodules that been present since  and others were new.  Her calcium remains high in we are unable to find her remaining parathyroid surgeon no surgery is planned.    Chromogranin and gastrin levels of been drawn today and are pending, the B12 was normal and the ferritin is pending.      SOCIAL HISTORY: She is  and lives with her . She works in an office. She does not smoke or drink. She has no risk factors for HIV.     FAMILY HISTORY: Father  at 71 of complications of Zollinger-Reed syndrome and kidney failure. Mother  at 64 of ovarian cancer metastatic to the colon. She has three half-brothers, one half-sister from her father. No ne of them have MEN-1 syndrome. She has a maternal grandmother who had probable breast cancer in her 50s and maternal great grandmother who  of breast cancer in her 50s.     Review of Systems   Respiratory: Negative.    Cardiovascular: Negative.    Musculoskeletal: Positive for back pain (comes and goes 10/15/2018).   Neurological: Positive for headaches (when blood sugar low 10/15/2018).      A comprehensive 14 point review of systems was performed and was negative except as mentioned.    Medications:  The current medication list was reviewed in the  "EMR    ALLERGIES:    Allergies   Allergen Reactions   • Risedronate Sodium Other (See Comments)     Headache,GASTRIC REFLUX       Objective      Vitals:    10/15/18 1041   BP: 138/83   Pulse: 92   Resp: 18   Temp: 97.8 °F (36.6 °C)   SpO2: 97%   Weight: 45.5 kg (100 lb 3.2 oz)   Height: 153 cm (60.24\")   PainSc: 0-No pain     Current Status 10/15/2018   ECOG score 0       Physical Exam    GENERAL:  Well-developed, well-nourished in no acute distress.   SKIN:  Warm, dry without rashes, purpura or petechiae.  HEAD:  Normocephalic.  EYES:  Pupils equal, round and reactive to light.  EOMs intact.  Conjunctivae normal.  EARS:  Hearing intact.  NOSE:  Septum midline.  No excoriations or nasal discharge.  MOUTH:  Tongue is well-papillated; no stomatitis or ulcers.  Lips normal.  THROAT:  Oropharynx without lesions or exudates.  NECK:  Supple with good range of motion; no thyromegaly or masses, no JVD.  LYMPHATICS:  No cervical, supraclavicular, axillary or inguinal adenopathy.  CHEST:  Lungs clear to percussion and auscultation. Good airflow.  BREASTS: Bilateral chest wall are benign with no masses   CARDIAC:  Regular rate and rhythm without murmurs, rubs or gallops. Normal S1,S2.  ABDOMEN:  Soft, nontender with no organomegaly or masses.  EXTREMITIES:  No clubbing, cyanosis or edema.  NEUROLOGICAL:  Cranial Nerves II-XII grossly intact.  No focal neurological deficits.  PSYCHIATRIC:  Normal affect and mood.        RECENT LABS:  Hematology WBC   Date Value Ref Range Status   10/15/2018 5.44 4.50 - 10.70 10*3/mm3 Final     RBC   Date Value Ref Range Status   10/15/2018 4.20 3.90 - 5.20 10*6/mm3 Final     Hemoglobin   Date Value Ref Range Status   10/15/2018 13.4 11.9 - 15.5 g/dL Final     Hematocrit   Date Value Ref Range Status   10/15/2018 41.5 35.6 - 45.5 % Final     Platelets   Date Value Ref Range Status   10/15/2018 360 140 - 500 10*3/mm3 Final       [id number] creatinine 0,8     Assessment/Plan   1. T 1cN 0  appear " positive HER-2 negative left breast cancer treated with bilateral mastectomies 5 years of an  aromatase inhibitors with an Oncotype score of 7   2.MEN1 syndrome post parathyroidectomy with persistent elevated calcium and inability to find her remaining parathyroid adenoma   3.pancreatic neuroendocrine carcinoma post Whipple procedure  chromogranin and gastrin levels being followed   4.osteoporosis on Prolia   5.pulmonary nodules?  Granuloma -stable on CAT scan for 1. yrs  6.remote history of uterine cancer post hysterectomy ovaries still in place -mother  with ovarian cancer but she has declined genetic testing for now  7.  Anemia multifactorial with iron deficiency in the past and chronic renal insufficiency stage III-low ferritin consistent with iron deficiency IV iron planned    Plan   1.Endocrinology to follow osteoporosis -?  Forteo  2. she will return in 6 months with a chromogranin level and gastrin   Referred to [facility] if chromogranin and gastrin keep rising      10/15/2018      CC:

## 2018-10-17 LAB — CGA SERPL-SCNC: 43 NMOL/L (ref 0–5)

## 2018-11-10 ENCOUNTER — DOCUMENTATION (OUTPATIENT)
Dept: ONCOLOGY | Facility: CLINIC | Age: 73
End: 2018-11-10

## 2018-11-10 NOTE — PROGRESS NOTES
Called patient regarding slow elevation of her chromogranin A  Will need to do a repeat octreotide scan she will call to schedule this  We will also refer to Dr. Franko Matthew at  to get the imaging done there

## 2018-11-12 ENCOUNTER — TELEPHONE (OUTPATIENT)
Dept: ONCOLOGY | Facility: HOSPITAL | Age: 73
End: 2018-11-12

## 2018-11-12 ENCOUNTER — TELEPHONE (OUTPATIENT)
Dept: GENERAL RADIOLOGY | Facility: HOSPITAL | Age: 73
End: 2018-11-12

## 2018-11-12 DIAGNOSIS — D3A.8 PRIMARY PANCREATIC NEUROENDOCRINE TUMOR: Primary | ICD-10-CM

## 2018-11-12 DIAGNOSIS — E31.21 MEN 1 (MULTIPLE ENDOCRINE NEOPLASIA) (HCC): Primary | ICD-10-CM

## 2018-11-12 DIAGNOSIS — C50.411 MALIGNANT NEOPLASM OF UPPER-OUTER QUADRANT OF RIGHT BREAST IN FEMALE, ESTROGEN RECEPTOR POSITIVE (HCC): ICD-10-CM

## 2018-11-12 DIAGNOSIS — Z17.0 MALIGNANT NEOPLASM OF UPPER-OUTER QUADRANT OF RIGHT BREAST IN FEMALE, ESTROGEN RECEPTOR POSITIVE (HCC): ICD-10-CM

## 2018-11-12 NOTE — TELEPHONE ENCOUNTER
----- Message from Annie Peña sent at 11/12/2018  9:42 AM EST -----  [phone]/ [phone]  Returning call with questions concerning  scan

## 2018-11-12 NOTE — TELEPHONE ENCOUNTER
Pt calling about scans and why she isn't possibly getting the Gallium scan.  I d/w Dr. Montalvo. She wants to cancel the scans here and just let pt see Dr. Franko Matthew at  and then he can determine what exact testing needs to be done.  Informed pt and she v/u.  Message sent to oncology nurse pool and appt desk.

## 2018-11-13 ENCOUNTER — TELEPHONE (OUTPATIENT)
Dept: ONCOLOGY | Facility: CLINIC | Age: 73
End: 2018-11-13

## 2018-11-13 NOTE — TELEPHONE ENCOUNTER
----- Message from Theresa Vee RN sent at 11/12/2018  1:23 PM EST -----  Yes. She knows.  Did you all send referral to [facility]?      ----- Message -----  From: Carin Charles RegSched Rep  Sent: 11/12/2018   1:15 PM  To: Theresa Vee RN    Appointments have been cancelled, - I will assume you are contacting patient to let her know.    Thank you!!    Margarita    ----- Message -----  From: Theresa Vee, RN  Sent: 11/12/2018  12:50 PM  To: Oncology Nurses, #    I s/w Dr. Montalvo and she wants to cancel all scans and just send pt to [facility] and he can decide what tests to order for pt.    Jose/Angelica, Can you cancel order.    Appt desk, can you cancel the 3 days of scan appts.  I will call pt to inform her of Dr. Montalvo decision.

## 2019-04-01 ENCOUNTER — APPOINTMENT (OUTPATIENT)
Dept: OTHER | Facility: HOSPITAL | Age: 74
End: 2019-04-01

## 2019-05-06 ENCOUNTER — LAB (OUTPATIENT)
Dept: OTHER | Facility: HOSPITAL | Age: 74
End: 2019-05-06

## 2019-05-06 ENCOUNTER — OFFICE VISIT (OUTPATIENT)
Dept: ONCOLOGY | Facility: CLINIC | Age: 74
End: 2019-05-06

## 2019-05-06 VITALS
HEIGHT: 60 IN | OXYGEN SATURATION: 96 % | HEART RATE: 72 BPM | SYSTOLIC BLOOD PRESSURE: 165 MMHG | WEIGHT: 100.8 LBS | RESPIRATION RATE: 16 BRPM | DIASTOLIC BLOOD PRESSURE: 74 MMHG | BODY MASS INDEX: 19.79 KG/M2 | TEMPERATURE: 98 F

## 2019-05-06 DIAGNOSIS — C50.411 MALIGNANT NEOPLASM OF UPPER-OUTER QUADRANT OF RIGHT BREAST IN FEMALE, ESTROGEN RECEPTOR POSITIVE (HCC): Primary | ICD-10-CM

## 2019-05-06 DIAGNOSIS — E31.21 MEN 1 (MULTIPLE ENDOCRINE NEOPLASIA) (HCC): ICD-10-CM

## 2019-05-06 DIAGNOSIS — Z17.0 MALIGNANT NEOPLASM OF UPPER-OUTER QUADRANT OF RIGHT BREAST IN FEMALE, ESTROGEN RECEPTOR POSITIVE (HCC): Primary | ICD-10-CM

## 2019-05-06 DIAGNOSIS — M81.8 OTHER OSTEOPOROSIS WITHOUT CURRENT PATHOLOGICAL FRACTURE: ICD-10-CM

## 2019-05-06 DIAGNOSIS — C7B.00 SECONDARY CARCINOID TUMOR (HCC): ICD-10-CM

## 2019-05-06 DIAGNOSIS — D3A.8 PRIMARY PANCREATIC NEUROENDOCRINE TUMOR: ICD-10-CM

## 2019-05-06 DIAGNOSIS — E61.1 IRON DEFICIENCY: ICD-10-CM

## 2019-05-06 LAB
ALBUMIN SERPL-MCNC: 4.3 G/DL (ref 3.5–5.2)
ALBUMIN/GLOB SERPL: 1.7 G/DL
ALP SERPL-CCNC: 111 U/L (ref 39–117)
ALT SERPL W P-5'-P-CCNC: 37 U/L (ref 1–33)
ANION GAP SERPL CALCULATED.3IONS-SCNC: 9.5 MMOL/L
AST SERPL-CCNC: 42 U/L (ref 1–32)
BASOPHILS # BLD AUTO: 0.06 10*3/MM3 (ref 0–0.2)
BASOPHILS NFR BLD AUTO: 1.2 % (ref 0–1.5)
BILIRUB SERPL-MCNC: 0.3 MG/DL (ref 0.1–1.2)
BUN BLD-MCNC: 12 MG/DL (ref 8–23)
BUN/CREAT SERPL: 11.8 (ref 7–25)
CALCIUM SPEC-SCNC: 10.7 MG/DL (ref 8.6–10.5)
CHLORIDE SERPL-SCNC: 103 MMOL/L (ref 98–107)
CO2 SERPL-SCNC: 28.5 MMOL/L (ref 22–29)
CREAT BLD-MCNC: 1.02 MG/DL (ref 0.57–1)
DEPRECATED RDW RBC AUTO: 51 FL (ref 37–54)
EOSINOPHIL # BLD AUTO: 0.1 10*3/MM3 (ref 0–0.4)
EOSINOPHIL NFR BLD AUTO: 2 % (ref 0.3–6.2)
ERYTHROCYTE [DISTWIDTH] IN BLOOD BY AUTOMATED COUNT: 13.8 % (ref 12.3–15.4)
GFR SERPL CREATININE-BSD FRML MDRD: 53 ML/MIN/1.73
GLOBULIN UR ELPH-MCNC: 2.5 GM/DL
GLUCOSE BLD-MCNC: 154 MG/DL (ref 65–99)
HCT VFR BLD AUTO: 39.5 % (ref 34–46.6)
HGB BLD-MCNC: 12.8 G/DL (ref 12–15.9)
IMM GRANULOCYTES # BLD AUTO: 0 10*3/MM3 (ref 0–0.05)
IMM GRANULOCYTES NFR BLD AUTO: 0 % (ref 0–0.5)
LYMPHOCYTES # BLD AUTO: 2.08 10*3/MM3 (ref 0.7–3.1)
LYMPHOCYTES NFR BLD AUTO: 40.8 % (ref 19.6–45.3)
MCH RBC QN AUTO: 32.2 PG (ref 26.6–33)
MCHC RBC AUTO-ENTMCNC: 32.4 G/DL (ref 31.5–35.7)
MCV RBC AUTO: 99.2 FL (ref 79–97)
MONOCYTES # BLD AUTO: 0.62 10*3/MM3 (ref 0.1–0.9)
MONOCYTES NFR BLD AUTO: 12.2 % (ref 5–12)
NEUTROPHILS # BLD AUTO: 2.24 10*3/MM3 (ref 1.7–7)
NEUTROPHILS NFR BLD AUTO: 43.8 % (ref 42.7–76)
NRBC BLD AUTO-RTO: 0 /100 WBC (ref 0–0.2)
PLATELET # BLD AUTO: 340 10*3/MM3 (ref 140–450)
PMV BLD AUTO: 10.7 FL (ref 6–12)
POTASSIUM BLD-SCNC: 4.1 MMOL/L (ref 3.5–5.2)
PROT SERPL-MCNC: 6.8 G/DL (ref 6–8.5)
RBC # BLD AUTO: 3.98 10*6/MM3 (ref 3.77–5.28)
SODIUM BLD-SCNC: 141 MMOL/L (ref 136–145)
WBC NRBC COR # BLD: 5.1 10*3/MM3 (ref 3.4–10.8)

## 2019-05-06 PROCEDURE — 85025 COMPLETE CBC W/AUTO DIFF WBC: CPT | Performed by: INTERNAL MEDICINE

## 2019-05-06 PROCEDURE — 99214 OFFICE O/P EST MOD 30 MIN: CPT | Performed by: INTERNAL MEDICINE

## 2019-05-06 PROCEDURE — 86316 IMMUNOASSAY TUMOR OTHER: CPT | Performed by: INTERNAL MEDICINE

## 2019-05-06 PROCEDURE — 80053 COMPREHEN METABOLIC PANEL: CPT | Performed by: INTERNAL MEDICINE

## 2019-05-06 PROCEDURE — 36415 COLL VENOUS BLD VENIPUNCTURE: CPT

## 2019-05-06 RX ORDER — CEPHALEXIN 500 MG/1
CAPSULE ORAL
COMMUNITY
Start: 2019-04-30 | End: 2022-02-20

## 2019-05-06 RX ORDER — POLYMYXIN B SULFATE AND TRIMETHOPRIM 1; 10000 MG/ML; [USP'U]/ML
SOLUTION OPHTHALMIC
COMMUNITY
Start: 2019-04-30 | End: 2020-03-30

## 2019-05-06 NOTE — PROGRESS NOTES
Subjective    REASONS FOR FOLLOWUP:    1. Stage I breast cancer on Arimidex.  [id number]  2. MEN-1 syndrome with persistent hypercalcemia.    3. 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 Removal of fifth parathyroid in 8/09, benign pathology.    5. Mild elevation of chromogranin and gastrin post pancreatectomy of uncertain etiology, probably related to PPIs.    6. Negative MRI of the abdomen 11/2014.    7. Marked gastrin  elevation, repeat in our office down to 170 which is stable.    8. Palpable left axillary lymph node, evaluation planned.  Mass aspirated and felt to be a benign cyst  9. Reexploration for elevated PTH with no definite parathyroid tissue in 10/2015.                    10.  Osteoporosis on Prolia       History of Present Illness  patient is a 72-year-old lady with a MEN type I syndrome comes in today for routine follow-up.  Her calcium is 10.7 today which is her usual level and a kidney function is better     Her anemia has resolved with iron infusion     She continues of fatigue and difficulty controlling her blood sugars and has not yet received her continuous glucose monitoring device because of insurance issues.  She continues of back pain which is chronic and her bone density today shows osteoporosis in her hips but normal in her spine and I told her to talk to her endocrinologist about Forteo     At this point she is almost 10 years and years out from her pancreatic neoplasm and I don't think  imaging is needed although I did warn her that  low-grade neuroendocrine carcinoma skin recurrence many years later.  We are watching her chromogranin which has been borderline for a while but the levels went up despite the same dose of her PPI so we sent her to see Dr. Matthew at [facility] and they did a gallium scan which was negative and therefore they do not think further imaging or chromogranin levels are  needed as it has been 10 years    PAST MEDICAL HISTORY: Significant for MEN-1 syndrome with hyperparathyroidism for which she had a subtotal parathyroidectomy in . She has had degenerative disks and had herniated disk operated on in .  She is intolerant of bisphosphona t es and calcium.   . The patient has persistent elevation of her calcium despite parathyroidectomy in the 11 range .   Active Ambulatory Problems     Diagnosis Date Noted   • Primary pancreatic neuroendocrine tumor 2016   • MEN 1 (multiple endocrine neoplasia) (CMS/HCC) 2016   • Malignant neoplasm of upper-outer quadrant of right female breast (CMS/HCC) 2016   • Osteoporosis 2016   • Iron deficiency 10/12/2016     Resolved Ambulatory Problems     Diagnosis Date Noted   • No Resolved Ambulatory Problems     Past Medical History:   Diagnosis Date   • Anemia    • Cancer (CMS/HCC)    • Diabetes mellitus (CMS/HCC)    • GERD (gastroesophageal reflux disease)    • H/O Endocrine tumors    • Hyperlipidemia    • Hyperparathyroidism (CMS/HCC)    • Intolerance of oral bisphosphonate therapy    • MEN 1 syndrome (CMS/HCC)    • Osteopenia    • Pancreatic cancer (CMS/HCC)      Past Surgical History:   Procedure Laterality Date   • ADENOIDECTOMY     • BACK SURGERY  1991    disc repair   • BASAL CELL CARCINOMA EXCISION     • BREAST SURGERY Right 2008    Biopsy   • COLONOSCOPY  ,    • HERNIA REPAIR  ,    • HYSTERECTOMY         • MASS EXCISION  2009   • MASTECTOMY     • PANCREATECTOMY     • PARATHYROIDECTOMY  ,     Subtotal   • SPINE SURGERY      Degenerative disks and herniated disks   • THYROID SURGERY     • TONSILLECTOMY      bilateral mastectomies  Whipple procedure    ONCOLOGIC HISTORY:   #1: The patient had an in situ carcinoma of the uterus and had a hysterectomy in  with no radiation and her ovaries were left in place. She is -1, para-1, first  childbirth was age 28. Menarche was at age 13. Menopause in her early 50s. She did not take hormone therapy    #2: Incidentally noted mammographic abnormality of the right breast at 9 o'clock measuring 15 mm and at 1 o'clock measuring 9 mm. Biopsy of the 1 o'clock lesion was benign. The 9 o' clock lesion showed a low grade ductal carcinoma measuring 1 cm in greatest dimension, ER/WV positive, HER/2 negative, Ki-67 nae vated at 23%. The patient underwent bilateral mastectomies  5/08 and mastectomy specimen showed a 1.4 cm residual breast cancer, grade 2 measuring 14 mm with no angiolymphatic invasion and clear margins, sentinel node was negative for malignancy. The invasive carcinoma had mixed duct and lobular features. Left breast was benign.   Onco type DX score showed a 7 which is low risk. Treatment with aromatase inhibitor plus Reclast is planned unless bone density shows marked worsening in which case we will give tamoxifen for two years and three years of an AI.   The patient's bone density shows osteopenia. The patient is on Arimidex with plans to start Reclast as she is intolerant to oral bisphosphonates.     #3 Patient had multiple pancreatic tumors which were endocrin e tumors, nonsecretory apparently per her history and had a total pancreatectomy and pancreaticoduodenectomy with hepatojejunostomy, cholecystectomy, splenectomy in December and retroperitoneal lymph node dissection with finding of microscopic metastatic disease in 1 of 26 lymph nodes.   No date on adjuvant treatment was available in the setting and none was given.   Gastrin level went up to 314 in March, back down to 124 in May of 2010. No treatment given. Chromogranin A was up to 18 in March, down to 14 in May, no treatment given.   Chromogranin A up to 28 on 6/23/10. Octreotide scan planned in early 2011.   CT scans chest, abdomen and pelvis 03/28/11 shows no evidence of recurrent disease. Chromogranin is 26, gastrin 192, a little higher  than October but the same as last spring and lack of upward trend is reassuring.   CT scans dated 10/10/2011 showed no evidence of recurrent disease. Stable cavernous hemangioma in the liver. Chromogranin level up to 100 on 09/28/2011 which is up from 39 in June but thi s is a new lab and we are waiting to see if this is a trend or a new baseline.   The patient was seen on 05/12/2012 with no complaints at four years on Arimidex. Chromogranin remains in the 100s. Gastrin is in the 190 range with negative CT scan.   The patient was seen on 1-08-13, doing fairly well with stable gastrin and chromogranin level of 90. Prolia was given in May but will not be renewed because she does not have osteoporosis but has very severe osteopenia.     The patient stopped Arimidex in July 2013, went on Evista for bone density issues. Chromogranin levels remain stable, but I do not have the levels from Dr. Bell' s office as of 12/2013. Annual followup to be continued with MRI of the liver at her next visit in one year.   Patient seen on 11/25/2014 and MRI of the abdomen and liver is unremarkable except for one new cyst in the liver and increasing number of renal cysts which are chronic and decrease in size of hemangioma in the liver that was noted previo usly. Chromogranin and gastrin levels remains stable in the endocrinologist' s office and I think these elevations are due to her PPIs. Calcium levels is mildly elevated and the parathyroid cannot be located and is being watched. Prolia to be given based on her bone density testing, which is pending.   The patient was seen on 06/16/2015 with marked elevation of chromogranin to 1600 (upper limit of normal 100 in new lab). Octreotide scan was done and negative. Repeat chromogranin testing was planned and if this is still elevated in our lab we will do an MRI of the abdomen to further evaluate and probably an upper endoscopy would be indicated to look for small bowel tumor chromogranin  level  160 repeat.   patient is a 71-year-old white female with MEN 1 syndrome with multiple parathyroid adenomas pancreatic neuroendocrine carcinoma producing gastrin and a stage I breast cancer treated with 5 years of adjuvant Arimidex and bilateral mastectomies.  She is here for routine follow-up and to continue her prolia treatments for her osteoporosis     Since I saw her last she has had no new complaints except for some abdominal discomfort which resolved.  We did CAT scans for review this and I called her with the results which essentially showed no abnormalities which were new except for multiple cysts in her kidneys and liver which were stable.her lung nodules of been stable with a repeat scan in 9-12 months is recommended    She did not make her genetic counseling appointment which we had done because of a maternal history of breast and ovarian cancer which we felt was distinct from her MEN  1 syndrome and she does not want to do this at this time.  CAT scan of the chest shows stability of the pulmonary nodules suggesting benign etiology.  We wanted to follow-up on the lung nodule seen on her scan last year some nodules that been present since  and others were new.  Her calcium remains high in we are unable to find her remaining parathyroid surgeon no surgery is planned.    Chromogranin and gastrin levels of been drawn today and are pending, the B12 was normal and the ferritin is pending.      SOCIAL HISTORY: She is  and lives with her . She works in an office. She does not smoke or drink. She has no risk factors for HIV.     FAMILY HISTORY: Father  at 71 of complications of Zollinger-Reed syndrome and kidney failure. Mother  at 64 of ovarian cancer metastatic to the colon. She has three half-brothers, one half-sister from her father. No ne of them have MEN-1 syndrome. She has a maternal grandmother who had probable breast cancer in her 50s and maternal great grandmother  "who  of breast cancer in her 50s.     Review of Systems   Constitutional: Positive for fever (19).   Respiratory: Negative.  Negative for chest tightness and shortness of breath.    Cardiovascular: Negative.  Negative for chest pain.   Gastrointestinal: Positive for constipation (19) and diarrhea (19).   Musculoskeletal: Positive for back pain (comes and goes same 19).   Neurological: Positive for headaches (comes and goes same 19).   Psychiatric/Behavioral: The patient is not nervous/anxious.       A comprehensive 14 point review of systems was performed and was negative except as mentioned.    Medications:  The current medication list was reviewed in the EMR    ALLERGIES:    Allergies   Allergen Reactions   • Risedronate Sodium Other (See Comments)     Headache,GASTRIC REFLUX       Objective      Vitals:    19 1443   BP: 165/74   Pulse: 72   Resp: 16   Temp: 98 °F (36.7 °C)   SpO2: 96%   Weight: 45.7 kg (100 lb 12.8 oz)   Height: 153 cm (60.24\")   PainSc: 0-No pain     Current Status 2019   ECOG score 0       Physical Exam    GENERAL:  Well-developed, well-nourished in no acute distress.   SKIN:  Warm, dry without rashes, purpura or petechiae.  HEAD:  Normocephalic.  EYES:  Pupils equal, round and reactive to light.  EOMs intact.  Conjunctivae normal.  EARS:  Hearing intact.  NOSE:  Septum midline.  No excoriations or nasal discharge.  MOUTH:  Tongue is well-papillated; no stomatitis or ulcers.  Lips normal.  THROAT:  Oropharynx without lesions or exudates.?  Small node left upper cervical  NECK:  Supple with good range of motion; no thyromegaly or masses, no JVD.  LYMPHATICS:  No cervical, supraclavicular, axillary or inguinal adenopathy.  CHEST:  Lungs clear to percussion and auscultation. Good airflow.  BREASTS: Bilateral chest wall are benign with no masses   CARDIAC:  Regular rate and rhythm without murmurs, rubs or gallops. Normal S1,S2.  ABDOMEN:  Soft, nontender with no " organomegaly or masses.  Lipoma left lower quadrant  EXTREMITIES:  No clubbing, cyanosis or edema.  NEUROLOGICAL:  Cranial Nerves II-XII grossly intact.  No focal neurological deficits.  PSYCHIATRIC:  Normal affect and mood.        RECENT LABS:  Hematology WBC   Date Value Ref Range Status   2019 5.10 3.40 - 10.80 10*3/mm3 Final     RBC   Date Value Ref Range Status   2019 3.98 3.77 - 5.28 10*6/mm3 Final     Hemoglobin   Date Value Ref Range Status   2019 12.8 12.0 - 15.9 g/dL Final     Hematocrit   Date Value Ref Range Status   2019 39.5 34.0 - 46.6 % Final     Platelets   Date Value Ref Range Status   2019 340 140 - 450 10*3/mm3 Final       Dmlxgvv82.9 creatinine 0,8           Assessment/Plan   1. T 1cN 0  appear positive HER-2 negative left breast cancer treated with bilateral mastectomies 5 years of an  aromatase inhibitors with an Oncotype score of 7   2.MEN1 syndrome post parathyroidectomy with persistent elevated calcium and inability to find her remaining parathyroid adenoma   3.pancreatic neuroendocrine carcinoma post Whipple procedure  chromogranin and gastrin levels being followed -rising levels from today gallium hotspot scan at [facility] which was totally negative  4.osteoporosis on Prolia   5.pulmonary nodules?  Granuloma -stable on CAT scan for 15. yrs  6.remote history of uterine cancer post hysterectomy ovaries still in place -mother  with ovarian cancer but she has declined genetic testing for now  7.  Anemia multifactorial with iron deficiency in the past and chronic renal insufficiency stage III-low ferritin consistent with iron deficiency IV iron given    Plan   1.Endocrinology to follow osteoporosis -?  Forteo  2. she will return in 12 months          2019      CC:

## 2019-05-09 LAB — CGA SERPL-SCNC: 28 NMOL/L (ref 0–5)

## 2020-03-30 ENCOUNTER — E-VISIT (OUTPATIENT)
Dept: FAMILY MEDICINE CLINIC | Facility: TELEHEALTH | Age: 75
End: 2020-03-30

## 2020-03-30 DIAGNOSIS — H10.9 CONJUNCTIVITIS OF LEFT EYE, UNSPECIFIED CONJUNCTIVITIS TYPE: Primary | ICD-10-CM

## 2020-03-30 PROCEDURE — 99421 OL DIG E/M SVC 5-10 MIN: CPT | Performed by: NURSE PRACTITIONER

## 2020-03-30 RX ORDER — POLYMYXIN B SULFATE AND TRIMETHOPRIM 1; 10000 MG/ML; [USP'U]/ML
1 SOLUTION OPHTHALMIC EVERY 6 HOURS
Qty: 10 ML | Refills: 0 | Status: SHIPPED | OUTPATIENT
Start: 2020-03-30 | End: 2022-02-20

## 2020-03-30 NOTE — PROGRESS NOTES
Lily Tong    1945  [id number]    I have reviewed the e-Visit questionnaire and patient's answers, my assessment and plan are as follows:    HPI  Patient reports her left eye is red with drainage and crusting.  She states that she noticed the discomfort yesterday.  She wears contacts only in her right eye, not in her left.  She has had similar symptoms and Polytrim has helped in the past.  Review of Systems - Negative except as noted in [facility]      Diagnoses and all orders for this visit:    Conjunctivitis of left eye, unspecified conjunctivitis type  -     trimethoprim-polymyxin b (Polytrim) 30956-6.1 UNIT/ML-% ophthalmic solution; Administer 1 drop into the left eye Every 6 (Six) Hours.    Medication as prescribed.  No eye make-up.  Tossed the eye make-up that you have been using.  Follow-up with your eye doctor if no improvement in 36 to 48 hours.    Any medications prescribed have been sent electronically to   [facility] [address] [place] [address] [address] [facility] - [phone] [facility] [phone]   [address] [address]  Phone: [phone] Fax: [phone]        CARLYN Farrar  03/30/20  9:08 AM         Abdomen soft, nontender, nondistended, bowel sounds present in all 4 quadrants.

## 2020-03-30 NOTE — PATIENT INSTRUCTIONS
Medication as prescribed.  No eye make-up.  Tossed the eye make-up that you have been using.  Follow-up with your eye doctor if no improvement in 36 to 48 hours.    Bacterial Conjunctivitis, Adult  Bacterial conjunctivitis is an infection of your conjunctiva. This is the clear membrane that covers the white part of your eye and the inner part of your eyelid. This infection can make your eye:  · Red or pink.  · Itchy.  This condition spreads easily from person to person (is contagious) and from one eye to the other eye.  What are the causes?  · This condition is caused by germs (bacteria). You may get the infection if you come into close contact with:  ? A person who has the infection.  ? Items that have germs on them (are contaminated), such as face towels, contact lens solution, or eye makeup.  What increases the risk?  You are more likely to get this condition if you:  · Have contact with people who have the infection.  · Wear contact lenses.  · Have a sinus infection.  · Have had a recent eye injury or surgery.  · Have a weak body defense system (immune system).  · Have dry eyes.  What are the signs or symptoms?    · Thick, yellowish discharge from the eye.  · Tearing or watery eyes.  · Itchy eyes.  · Burning feeling in your eyes.  · Eye redness.  · Swollen eyelids.  · Blurred vision.  How is this treated?    · Antibiotic eye drops or ointment.  · Antibiotic medicine taken by mouth. This is used for infections that do not get better with drops or ointment or that last more than 10 days.  · Cool, wet cloths placed on the eyes.  · Artificial tears used 2-6 times a day.  Follow these instructions at home:  Medicines  · Take or apply your antibiotic medicine as told by your doctor. Do not stop taking or applying the antibiotic even if you start to feel better.  · Take or apply over-the-counter and prescription medicines only as told by your doctor.  · Do not touch your eyelid with the eye-drop bottle or the  ointment tube.  Managing discomfort  · Wipe any fluid from your eye with a warm, wet washcloth or a cotton ball.  · Place a clean, cool, wet cloth on your eye. Do this for 10-20 minutes, 3-4 times per day.  General instructions  · Do not wear contacts until the infection is gone. Wear glasses until your doctor says it is okay to wear contacts again.  · Do not wear eye makeup until the infection is gone. Throw away old eye makeup.  · Change or wash your pillowcase every day.  · Do not share towels or washcloths.  · Wash your hands often with soap and water. Use paper towels to dry your hands.  · Do not touch or rub your eyes.  · Do not drive or use heavy machinery if your vision is blurred.  Contact a doctor if:  · You have a fever.  · You do not get better after 10 days.  Get help right away if:  · You have a fever and your symptoms get worse all of a sudden.  · You have very bad pain when you move your eye.  · Your face:  ? Hurts.  ? Is red.  ? Is swollen.  · You have sudden loss of vision.  Summary  · Bacterial conjunctivitis is an infection of your conjunctiva.  · This infection spreads easily from person to person.  · Wash your hands often with soap and water. Use paper towels to dry your hands.  · Take or apply your antibiotic medicine as told by your doctor.  · Contact a doctor if you have a fever or you do not get better after 10 days.  This information is not intended to replace advice given to you by your health care provider. Make sure you discuss any questions you have with your health care provider.  Document Released: 09/26/2009 Document Revised: 07/24/2019 Document Reviewed: 07/24/2019  [employer] Interactive Patient Education © 2020 [employer]

## 2022-02-20 ENCOUNTER — APPOINTMENT (OUTPATIENT)
Dept: GENERAL RADIOLOGY | Facility: HOSPITAL | Age: 77
End: 2022-02-20

## 2022-02-20 ENCOUNTER — HOSPITAL ENCOUNTER (OUTPATIENT)
Facility: HOSPITAL | Age: 77
Setting detail: OBSERVATION
Discharge: HOME | End: 2022-02-21
Attending: EMERGENCY MEDICINE | Admitting: HOSPITALIST

## 2022-02-20 DIAGNOSIS — I49.8 VENTRICULAR TRIGEMINY: ICD-10-CM

## 2022-02-20 DIAGNOSIS — M79.622 LEFT UPPER ARM PAIN: Primary | ICD-10-CM

## 2022-02-20 LAB
ALBUMIN SERPL-MCNC: 4.4 G/DL (ref 3.5–5.2)
ALBUMIN/GLOB SERPL: 1.3 G/DL
ALP SERPL-CCNC: 88 U/L (ref 39–117)
ALT SERPL W P-5'-P-CCNC: 44 U/L (ref 1–33)
ANION GAP SERPL CALCULATED.3IONS-SCNC: 13.3 MMOL/L (ref 5–15)
AST SERPL-CCNC: 62 U/L (ref 1–32)
BASOPHILS # BLD AUTO: 0.06 10*3/MM3 (ref 0–0.2)
BASOPHILS NFR BLD AUTO: 1.1 % (ref 0–1.5)
BILIRUB SERPL-MCNC: 0.6 MG/DL (ref 0–1.2)
BUN SERPL-MCNC: 13 MG/DL (ref 8–23)
BUN/CREAT SERPL: 12.9 (ref 7–25)
CALCIUM SPEC-SCNC: 11.1 MG/DL (ref 8.6–10.5)
CHLORIDE SERPL-SCNC: 97 MMOL/L (ref 98–107)
CO2 SERPL-SCNC: 24.7 MMOL/L (ref 22–29)
CREAT SERPL-MCNC: 1.01 MG/DL (ref 0.57–1)
DEPRECATED RDW RBC AUTO: 51.2 FL (ref 37–54)
EOSINOPHIL # BLD AUTO: 0.07 10*3/MM3 (ref 0–0.4)
EOSINOPHIL NFR BLD AUTO: 1.3 % (ref 0.3–6.2)
ERYTHROCYTE [DISTWIDTH] IN BLOOD BY AUTOMATED COUNT: 13.7 % (ref 12.3–15.4)
GFR SERPL CREATININE-BSD FRML MDRD: 53 ML/MIN/1.73
GLOBULIN UR ELPH-MCNC: 3.4 GM/DL
GLUCOSE SERPL-MCNC: 173 MG/DL (ref 65–99)
HCT VFR BLD AUTO: 43.3 % (ref 34–46.6)
HGB BLD-MCNC: 13.8 G/DL (ref 12–15.9)
IMM GRANULOCYTES # BLD AUTO: 0.01 10*3/MM3 (ref 0–0.05)
IMM GRANULOCYTES NFR BLD AUTO: 0.2 % (ref 0–0.5)
LYMPHOCYTES # BLD AUTO: 1.52 10*3/MM3 (ref 0.7–3.1)
LYMPHOCYTES NFR BLD AUTO: 27.6 % (ref 19.6–45.3)
MCH RBC QN AUTO: 31.4 PG (ref 26.6–33)
MCHC RBC AUTO-ENTMCNC: 31.9 G/DL (ref 31.5–35.7)
MCV RBC AUTO: 98.6 FL (ref 79–97)
MONOCYTES # BLD AUTO: 0.66 10*3/MM3 (ref 0.1–0.9)
MONOCYTES NFR BLD AUTO: 12 % (ref 5–12)
NEUTROPHILS NFR BLD AUTO: 3.18 10*3/MM3 (ref 1.7–7)
NEUTROPHILS NFR BLD AUTO: 57.8 % (ref 42.7–76)
PLATELET # BLD AUTO: 345 10*3/MM3 (ref 140–450)
PMV BLD AUTO: 10.7 FL (ref 6–12)
POTASSIUM SERPL-SCNC: 3.9 MMOL/L (ref 3.5–5.2)
PROT SERPL-MCNC: 7.8 G/DL (ref 6–8.5)
QT INTERVAL: 384 MS
RBC # BLD AUTO: 4.39 10*6/MM3 (ref 3.77–5.28)
SARS-COV-2 RNA PNL SPEC NAA+PROBE: NOT DETECTED
SODIUM SERPL-SCNC: 135 MMOL/L (ref 136–145)
TROPONIN T SERPL-MCNC: <0.01 NG/ML (ref 0–0.03)
WBC NRBC COR # BLD: 5.5 10*3/MM3 (ref 3.4–10.8)

## 2022-02-20 PROCEDURE — 99220 PR INITIAL OBSERVATION CARE/DAY 70 MINUTES: CPT | Performed by: INTERNAL MEDICINE

## 2022-02-20 PROCEDURE — G0378 HOSPITAL OBSERVATION PER HR: HCPCS

## 2022-02-20 PROCEDURE — 93010 ELECTROCARDIOGRAM REPORT: CPT | Performed by: INTERNAL MEDICINE

## 2022-02-20 PROCEDURE — 71046 X-RAY EXAM CHEST 2 VIEWS: CPT

## 2022-02-20 PROCEDURE — 25010000002 ENOXAPARIN PER 10 MG: Performed by: INTERNAL MEDICINE

## 2022-02-20 PROCEDURE — 93005 ELECTROCARDIOGRAM TRACING: CPT | Performed by: EMERGENCY MEDICINE

## 2022-02-20 PROCEDURE — C9803 HOPD COVID-19 SPEC COLLECT: HCPCS

## 2022-02-20 PROCEDURE — 99284 EMERGENCY DEPT VISIT MOD MDM: CPT | Performed by: EMERGENCY MEDICINE

## 2022-02-20 PROCEDURE — 84484 ASSAY OF TROPONIN QUANT: CPT | Performed by: INTERNAL MEDICINE

## 2022-02-20 PROCEDURE — 80053 COMPREHEN METABOLIC PANEL: CPT | Performed by: EMERGENCY MEDICINE

## 2022-02-20 PROCEDURE — 93005 ELECTROCARDIOGRAM TRACING: CPT

## 2022-02-20 PROCEDURE — 87635 SARS-COV-2 COVID-19 AMP PRB: CPT | Performed by: EMERGENCY MEDICINE

## 2022-02-20 PROCEDURE — 85025 COMPLETE CBC W/AUTO DIFF WBC: CPT | Performed by: EMERGENCY MEDICINE

## 2022-02-20 PROCEDURE — 96372 THER/PROPH/DIAG INJ SC/IM: CPT

## 2022-02-20 PROCEDURE — 84484 ASSAY OF TROPONIN QUANT: CPT | Performed by: EMERGENCY MEDICINE

## 2022-02-20 PROCEDURE — 94799 UNLISTED PULMONARY SVC/PX: CPT

## 2022-02-20 PROCEDURE — 99284 EMERGENCY DEPT VISIT MOD MDM: CPT

## 2022-02-20 RX ORDER — ONDANSETRON 2 MG/ML
4 INJECTION INTRAMUSCULAR; INTRAVENOUS EVERY 6 HOURS PRN
Status: DISCONTINUED | OUTPATIENT
Start: 2022-02-20 | End: 2022-02-21 | Stop reason: HOSPADM

## 2022-02-20 RX ORDER — ASPIRIN 81 MG/1
81 TABLET ORAL DAILY
Status: DISCONTINUED | OUTPATIENT
Start: 2022-02-21 | End: 2022-02-21 | Stop reason: HOSPADM

## 2022-02-20 RX ORDER — ACETAMINOPHEN 325 MG/1
650 TABLET ORAL EVERY 4 HOURS PRN
Status: DISCONTINUED | OUTPATIENT
Start: 2022-02-20 | End: 2022-02-21 | Stop reason: HOSPADM

## 2022-02-20 RX ORDER — SODIUM CHLORIDE 0.9 % (FLUSH) 0.9 %
10 SYRINGE (ML) INJECTION EVERY 12 HOURS SCHEDULED
Status: DISCONTINUED | OUTPATIENT
Start: 2022-02-20 | End: 2022-02-21 | Stop reason: HOSPADM

## 2022-02-20 RX ORDER — CHOLECALCIFEROL (VITAMIN D3) 125 MCG
5 CAPSULE ORAL NIGHTLY PRN
Status: DISCONTINUED | OUTPATIENT
Start: 2022-02-20 | End: 2022-02-21 | Stop reason: HOSPADM

## 2022-02-20 RX ORDER — DEXTROSE MONOHYDRATE 25 G/50ML
25 INJECTION, SOLUTION INTRAVENOUS
Status: DISCONTINUED | OUTPATIENT
Start: 2022-02-20 | End: 2022-02-21 | Stop reason: HOSPADM

## 2022-02-20 RX ORDER — PANTOPRAZOLE SODIUM 40 MG/1
40 TABLET, DELAYED RELEASE ORAL DAILY
Status: DISCONTINUED | OUTPATIENT
Start: 2022-02-20 | End: 2022-02-21 | Stop reason: HOSPADM

## 2022-02-20 RX ORDER — NITROGLYCERIN 0.4 MG/1
0.4 TABLET SUBLINGUAL
Status: DISCONTINUED | OUTPATIENT
Start: 2022-02-20 | End: 2022-02-21 | Stop reason: HOSPADM

## 2022-02-20 RX ORDER — SUCRALFATE 1 G/1
1 TABLET ORAL DAILY
Status: DISCONTINUED | OUTPATIENT
Start: 2022-02-20 | End: 2022-02-21 | Stop reason: HOSPADM

## 2022-02-20 RX ORDER — AMLODIPINE BESYLATE 5 MG/1
5 TABLET ORAL DAILY
Status: DISCONTINUED | OUTPATIENT
Start: 2022-02-20 | End: 2022-02-21 | Stop reason: HOSPADM

## 2022-02-20 RX ORDER — ACETAMINOPHEN 650 MG/1
650 SUPPOSITORY RECTAL EVERY 4 HOURS PRN
Status: DISCONTINUED | OUTPATIENT
Start: 2022-02-20 | End: 2022-02-21 | Stop reason: HOSPADM

## 2022-02-20 RX ORDER — SODIUM CHLORIDE 9 MG/ML
40 INJECTION, SOLUTION INTRAVENOUS AS NEEDED
Status: DISCONTINUED | OUTPATIENT
Start: 2022-02-20 | End: 2022-02-21 | Stop reason: HOSPADM

## 2022-02-20 RX ORDER — SODIUM CHLORIDE 0.9 % (FLUSH) 0.9 %
10 SYRINGE (ML) INJECTION AS NEEDED
Status: DISCONTINUED | OUTPATIENT
Start: 2022-02-20 | End: 2022-02-21 | Stop reason: HOSPADM

## 2022-02-20 RX ORDER — ONDANSETRON 4 MG/1
4 TABLET, FILM COATED ORAL EVERY 6 HOURS PRN
Status: DISCONTINUED | OUTPATIENT
Start: 2022-02-20 | End: 2022-02-21 | Stop reason: HOSPADM

## 2022-02-20 RX ORDER — INSULIN GLULISINE 100 [IU]/ML
INJECTION, SOLUTION SUBCUTANEOUS
COMMUNITY

## 2022-02-20 RX ORDER — ALUMINA, MAGNESIA, AND SIMETHICONE 2400; 2400; 240 MG/30ML; MG/30ML; MG/30ML
15 SUSPENSION ORAL EVERY 6 HOURS PRN
Status: DISCONTINUED | OUTPATIENT
Start: 2022-02-20 | End: 2022-02-21 | Stop reason: HOSPADM

## 2022-02-20 RX ORDER — ACETAMINOPHEN 160 MG/5ML
650 SOLUTION ORAL EVERY 4 HOURS PRN
Status: DISCONTINUED | OUTPATIENT
Start: 2022-02-20 | End: 2022-02-21 | Stop reason: HOSPADM

## 2022-02-20 RX ORDER — LABETALOL HYDROCHLORIDE 5 MG/ML
10 INJECTION, SOLUTION INTRAVENOUS EVERY 6 HOURS PRN
Status: DISCONTINUED | OUTPATIENT
Start: 2022-02-20 | End: 2022-02-21 | Stop reason: HOSPADM

## 2022-02-20 RX ORDER — ASPIRIN 325 MG
325 TABLET ORAL ONCE
Status: COMPLETED | OUTPATIENT
Start: 2022-02-20 | End: 2022-02-20

## 2022-02-20 RX ORDER — NICOTINE POLACRILEX 4 MG
15 LOZENGE BUCCAL
Status: DISCONTINUED | OUTPATIENT
Start: 2022-02-20 | End: 2022-02-21 | Stop reason: HOSPADM

## 2022-02-20 RX ORDER — ACETAMINOPHEN 325 MG/1
650 TABLET ORAL EVERY 6 HOURS PRN
COMMUNITY

## 2022-02-20 RX ORDER — ATORVASTATIN CALCIUM 10 MG/1
10 TABLET, FILM COATED ORAL NIGHTLY
Status: DISCONTINUED | OUTPATIENT
Start: 2022-02-20 | End: 2022-02-21 | Stop reason: HOSPADM

## 2022-02-20 RX ORDER — SODIUM CHLORIDE 9 MG/ML
75 INJECTION, SOLUTION INTRAVENOUS CONTINUOUS
Status: DISCONTINUED | OUTPATIENT
Start: 2022-02-20 | End: 2022-02-21 | Stop reason: HOSPADM

## 2022-02-20 RX ADMIN — NITROGLYCERIN 1 INCH: 20 OINTMENT TOPICAL at 15:00

## 2022-02-20 RX ADMIN — ASPIRIN 325 MG ORAL TABLET 325 MG: 325 PILL ORAL at 11:50

## 2022-02-20 RX ADMIN — NITROGLYCERIN 0.4 MG: 0.4 TABLET SUBLINGUAL at 11:50

## 2022-02-20 RX ADMIN — SODIUM CHLORIDE 75 ML/HR: 9 INJECTION, SOLUTION INTRAVENOUS at 16:44

## 2022-02-20 RX ADMIN — Medication 10 ML: at 20:24

## 2022-02-20 RX ADMIN — ATORVASTATIN CALCIUM 10 MG: 10 TABLET, FILM COATED ORAL at 20:00

## 2022-02-20 RX ADMIN — ACETAMINOPHEN 650 MG: 325 TABLET ORAL at 19:59

## 2022-02-20 RX ADMIN — ENOXAPARIN SODIUM 40 MG: 40 INJECTION SUBCUTANEOUS at 18:33

## 2022-02-20 NOTE — NURSING NOTE
Patient has continuous glucometer implant- verbal order placed that we can use these readings. Patient stated we could administer insulin, but then proceeded to self administer.  was notified, and spoke with patient. Patient is now stating that we can admin insulin, and that she will refrain from doing so.

## 2022-02-20 NOTE — PLAN OF CARE
Goal Outcome Evaluation:  Plan of Care Reviewed With: patient        Progress: no change  Outcome Summary: patient admtted this shift, oriented her to care setting. patient has glucose monitor implant- orders and education on this while hospitalized are in progress. NPO after midnight, to be seen by [facility] in the AM

## 2022-02-20 NOTE — ED PROVIDER NOTES
EMERGENCY DEPARTMENT ENCOUNTER      Room Number: 10/10      HPI:    Chief complaint: Arm and neck pain    Location: Left arm and left neck    Quality/Severity: Moderate    Timing/Duration: Symptoms started this morning    Modifying Factors: None    Associated Symptoms: Mild nausea    Narrative: Pt is a 76 y.o. female who presents complaining of left arm and left neck pain as noted above.  Symptoms started with light activity around the house.  Patient specifically denies chest pain and has no history of coronary artery disease.  No shortness of breath, palpitations or diaphoresis      PMD: Kahlil Dominique MD    REVIEW OF SYSTEMS  Review of Systems   Constitutional: Negative for activity change, appetite change, fatigue and fever.   HENT: Negative for congestion.    Respiratory: Negative for cough, shortness of breath and wheezing.    Cardiovascular: Negative for chest pain, palpitations and leg swelling.   Gastrointestinal: Negative for abdominal pain, diarrhea, nausea and vomiting.   Endocrine: Negative for polydipsia.   Genitourinary: Negative for difficulty urinating, dysuria, flank pain, frequency and urgency.   Musculoskeletal: Negative for back pain.        Left neck and left arm pain as previously documented   Skin: Negative for rash.   Neurological: Negative for dizziness, weakness and headaches.   Psychiatric/Behavioral: Negative for confusion.   All other systems reviewed and are negative.      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) 06/17/2016   • Osteoporosis 07/01/2016   • Iron deficiency 10/12/2016     Resolved Ambulatory Problems     Diagnosis Date Noted   • No Resolved Ambulatory Problems     Past Medical History:   Diagnosis Date   • Anemia    • Cancer (HCC) 2008   •  Diabetes mellitus (HCC)    • GERD (gastroesophageal reflux disease)    • H/O Endocrine tumors    • Hyperlipidemia    • Hyperparathyroidism (HCC)    • Intolerance of oral bisphosphonate therapy    • MEN 1 syndrome (HCC)    • Osteopenia    • Pancreatic cancer (HCC)        PAST SURGICAL HISTORY  Past Surgical History:   Procedure Laterality Date   • ADENOIDECTOMY     • BACK SURGERY  1991    disc repair   • BASAL CELL CARCINOMA EXCISION     • BREAST SURGERY Right 2008    Biopsy   • COLONOSCOPY  ,    • HERNIA REPAIR  ,    • HYSTERECTOMY         • MASS EXCISION  2009   • MASTECTOMY     • PANCREATECTOMY     • PARATHYROIDECTOMY  ,     Subtotal   • SPINE SURGERY      Degenerative disks and herniated disks   • THYROID SURGERY     • TONSILLECTOMY         FAMILY HISTORY  Family History   Problem Relation Age of Onset   • Ovarian cancer Mother 65   • Colon cancer Mother 65   • Kidney failure Father 72   • Cancer Maternal Grandmother    • Breast cancer Other        SOCIAL HISTORY  Social History     Socioeconomic History   • Marital status:      Spouse name: Gavin   • Number of children: 1   Tobacco Use   • Smoking status: Former Smoker     Quit date:      Years since quittin.1   • Smokeless tobacco: Never Used   Substance and Sexual Activity   • Alcohol use: Yes     Alcohol/week: 1.0 standard drink     Types: 1 Glasses of wine per week     Comment: occasionally   • Drug use: No   • Sexual activity: Defer       ALLERGIES  Risedronate sodium    PHYSICAL EXAM  ED Triage Vitals [22 1058]   Temp Heart Rate Resp BP SpO2   97.7 °F (36.5 °C) 81 18 (!) 189/91 99 %      Temp src Heart Rate Source Patient Position BP Location FiO2 (%)   Temporal -- -- -- --       Physical Exam  Vitals and nursing note reviewed.   Constitutional:       Comments: The patient is a healthy-appearing, 76-year-old, female in no acute distress.   HENT:      Head:  Normocephalic and atraumatic.   Eyes:      Extraocular Movements: Extraocular movements intact.      Conjunctiva/sclera: Conjunctivae normal.   Neck:      Thyroid: No thyromegaly.   Cardiovascular:      Rate and Rhythm: Normal rate and regular rhythm.      Heart sounds: Normal heart sounds. No murmur heard.      Pulmonary:      Effort: Pulmonary effort is normal. No respiratory distress.      Breath sounds: Normal breath sounds.   Abdominal:      General: Bowel sounds are normal.      Palpations: Abdomen is soft.      Tenderness: There is no abdominal tenderness.   Musculoskeletal:         General: Normal range of motion.      Cervical back: Normal range of motion and neck supple.   Lymphadenopathy:      Cervical: No cervical adenopathy.   Skin:     General: Skin is warm and dry.   Neurological:      General: No focal deficit present.      Mental Status: She is alert and oriented to person, place, and time.   Psychiatric:         Mood and Affect: Mood and affect normal.         Behavior: Behavior normal.         Thought Content: Thought content normal.         Judgment: Judgment normal.         LAB RESULTS  Results for orders placed or performed during the hospital encounter of 02/20/22   Comprehensive Metabolic Panel    Specimen: Blood   Result Value Ref Range    Glucose 173 (H) 65 - 99 mg/dL    BUN 13 8 - 23 mg/dL    Creatinine 1.01 (H) 0.57 - 1.00 mg/dL    Sodium 135 (L) 136 - 145 mmol/L    Potassium 3.9 3.5 - 5.2 mmol/L    Chloride 97 (L) 98 - 107 mmol/L    CO2 24.7 22.0 - 29.0 mmol/L    Calcium 11.1 (H) 8.6 - 10.5 mg/dL    Total Protein 7.8 6.0 - 8.5 g/dL    Albumin 4.40 3.50 - 5.20 g/dL    ALT (SGPT) 44 (H) 1 - 33 U/L    AST (SGOT) 62 (H) 1 - 32 U/L    Alkaline Phosphatase 88 39 - 117 U/L    Total Bilirubin 0.6 0.0 - 1.2 mg/dL    eGFR Non African Amer 53 (L) >60 mL/min/1.73    Globulin 3.4 gm/dL    A/G Ratio 1.3 g/dL    BUN/Creatinine Ratio 12.9 7.0 - 25.0    Anion Gap 13.3 5.0 - 15.0 mmol/L   Troponin     Specimen: Blood   Result Value Ref Range    Troponin T <0.010 0.000 - 0.030 ng/mL   CBC Auto Differential    Specimen: Blood   Result Value Ref Range    WBC 5.50 3.40 - 10.80 10*3/mm3    RBC 4.39 3.77 - 5.28 10*6/mm3    Hemoglobin 13.8 12.0 - 15.9 g/dL    Hematocrit 43.3 34.0 - 46.6 %    MCV 98.6 (H) 79.0 - 97.0 fL    MCH 31.4 26.6 - 33.0 pg    MCHC 31.9 31.5 - 35.7 g/dL    RDW 13.7 12.3 - 15.4 %    RDW-SD 51.2 37.0 - 54.0 fl    MPV 10.7 6.0 - 12.0 fL    Platelets 345 140 - 450 10*3/mm3    Neutrophil % 57.8 42.7 - 76.0 %    Lymphocyte % 27.6 19.6 - 45.3 %    Monocyte % 12.0 5.0 - 12.0 %    Eosinophil % 1.3 0.3 - 6.2 %    Basophil % 1.1 0.0 - 1.5 %    Immature Grans % 0.2 0.0 - 0.5 %    Neutrophils, Absolute 3.18 1.70 - 7.00 10*3/mm3    Lymphocytes, Absolute 1.52 0.70 - 3.10 10*3/mm3    Monocytes, Absolute 0.66 0.10 - 0.90 10*3/mm3    Eosinophils, Absolute 0.07 0.00 - 0.40 10*3/mm3    Basophils, Absolute 0.06 0.00 - 0.20 10*3/mm3    Immature Grans, Absolute 0.01 0.00 - 0.05 10*3/mm3   Troponin    Specimen: Blood   Result Value Ref Range    Troponin T <0.010 0.000 - 0.030 ng/mL   ECG 12 Lead   Result Value Ref Range    QT Interval 384 ms         I ordered the above labs and reviewed the results    RADIOLOGY  XR Chest 2 View    Result Date: 2/20/2022  Narrative: CR Chest 2 Vws INDICATION:  Left arm pain and neck pain beginning today COMPARISON:  5/25/2015 FINDINGS: PA and lateral views of the chest.  Heart size is normal. The aorta is tortuous. Both lungs are clear with normal vascular markings. No effusions are seen.      Impression: No acute cardiopulmonary findings. Signer Name: Gus Barney MD  Signed: 2/20/2022 11:13 AM  Workstation Name: [id number]  [facility]      I ordered the above radiologic testing and reviewed the results    PROCEDURES  Procedures      PROGRESS AND CONSULTS  ED Course as of 02/20/22 1453   Sun Feb 20, 2022   1313 EKG tracing was contemporaneously reviewed at 1055  hrs. and showed a normal sinus rhythm with a rate of 84 bpm.  P waves, QRS complexes, ST segments and T waves all unremarkable.  Normal ECG.  The bedside cardiac monitor did show some episodes of nonsustained ventricular trigeminy. [ML]   1445 Patient did state that sublingual nitroglycerin did help her discomfort some.  Although the patient's emergency department work-up was negative the patient does have multiple risk factors.HEART score of 5.  Patient is agreeable to admission on observation status for cardiology consult.  Case and findings discussed with the on-call hospitalist, Dr. Mcmullen, who agreed that observation was warranted. [ML]      ED Course User Index  [ML] Trent Guillory MD           MEDICAL DECISION MAKING  Results were reviewed/discussed with the patient and they were also made aware of online access. Pt also made aware that some labs, such as cultures, will not be resulted during ER visit and follow up with PMD is necessary.     MDM       DIAGNOSIS  Final diagnoses:   Left upper arm pain   Ventricular trigeminy       Latest Documented Vital Signs:  As of 14:53 EST  BP- (!) 189/91 HR- 81 Temp- 97.7 °F (36.5 °C) (Temporal) O2 sat- 97%    DISPOSITION  Patient admitted to telemetry bed on observation basis.       Medication List      No changes were made to your prescriptions during this visit.                Trent Guillory MD  02/20/22 0284

## 2022-02-20 NOTE — H&P
[facility]     Kahlil Dominique MD    CHIEF COMPLAINT:     Left arm/jaw pain    HISTORY OF PRESENT ILLNESS:    76-year-old female past medical history as noted below presented to the ER for sudden onset of left arm and jaw pain.  Reports she initially woke up this morning and started to do some work however after starting some activity noticed that she had some pressure type pain radiating down her left shoulder into her left elbow and up into her left jaw.  She had no nausea vomiting or diaphoresis.  She had no shortness of breath.  She tried to rest and let the pain subside however the pain only intensified.  Since it was intensifying she came to the ER for evaluation.  In the ER she had work-up including negative troponins, negative EKG.  However she was given nitroglycerin and the pain subsided.  Given the concerning features, ER requested admission.      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   • Hyperlipidemia    • Hyperparathyroidism (HCC)    • Intolerance of oral bisphosphonate therapy    • MEN 1 syndrome (HCC)     w/persistent hypercalcemia   • Osteopenia     Mild   • Pancreatic cancer (HCC) 2008     Past Surgical History:   Procedure Laterality Date   • ADENOIDECTOMY  1997   • BACK SURGERY  1991    disc repair   • BASAL CELL CARCINOMA EXCISION  2005   • BREAST SURGERY Right 2008    Biopsy   • COLONOSCOPY  2010, 2014   • HERNIA REPAIR  2009, 2010   • HYSTERECTOMY      1976   • MASS EXCISION  05/29/2009   • MASTECTOMY  2008   • PANCREATECTOMY  2008   • PARATHYROIDECTOMY  1982, 1983    Subtotal   • SPINE SURGERY  1991    Degenerative disks and herniated disks   • THYROID SURGERY  2009   • TONSILLECTOMY  1953     Family History   Problem Relation Age  "of Onset   • Ovarian cancer Mother 65   • Colon cancer Mother 65   • Kidney failure Father 72   • Cancer Maternal Grandmother    • Breast cancer Other      Social History     Tobacco Use   • Smoking status: Former Smoker     Quit date:      Years since quittin.1   • Smokeless tobacco: Never Used   Substance Use Topics   • Alcohol use: Yes     Alcohol/week: 1.0 standard drink     Types: 1 Glasses of wine per week     Comment: occasionally   • Drug use: No     (Not in a hospital admission)    Allergies:  Risedronate sodium    Immunization History   Administered Date(s) Administered   • COVID-19 (PFIZER) PURPLE CAP 2021, 2021, 10/18/2021   • Fluzone High-Dose 65+yrs 10/13/2021   • Pneumococcal Conjugate 13-Valent (PCV13) 10/30/2017   • Tdap 2016           REVIEW OF SYSTEMS:  Please see the above history of present illness for pertinent positives and negatives.  The remainder of the patient's systems have been reviewed and are negative.     Objective     Vital Signs  Temp:  [97.7 °F (36.5 °C)] 97.7 °F (36.5 °C)  Heart Rate:  [78-92] 92  Resp:  [18] 18  BP: (182-189)/(88-91) 182/88    Flowsheet Rows      First Filed Value   Admission Height 160 cm (63\") Documented at 2022 1051   Admission Weight 49 kg (108 lb) Documented at 2022 1051           Physical Exam:  Physical Exam  Vitals reviewed.   Constitutional:       General: She is not in acute distress.  HENT:      Head: Normocephalic.   Eyes:      Pupils: Pupils are equal, round, and reactive to light.   Cardiovascular:      Rate and Rhythm: Normal rate.   Pulmonary:      Effort: Pulmonary effort is normal. No respiratory distress.   Abdominal:      General: There is no distension.      Palpations: Abdomen is soft.   Musculoskeletal:      Cervical back: Neck supple. No rigidity.      Right lower leg: No edema.      Left lower leg: No edema.   Skin:     General: Skin is warm.   Neurological:      Mental Status: She is alert. "   Psychiatric:         Behavior: Behavior normal.           Results Review:    I reviewed the patient's new clinical results.  Lab Results (most recent)     Procedure Component Value Units Date/Time    COVID PRE-OP / PRE-PROCEDURE SCREENING ORDER (NO ISOLATION) - Swab, Nasal Cavity [[id number]] Collected: 02/20/22 1510    Specimen: Swab from Nasal Cavity Updated: 02/20/22 1514    Narrative:      The following orders were created for panel order COVID PRE-OP / PRE-PROCEDURE SCREENING ORDER (NO ISOLATION) - Swab, Nasal Cavity.  Procedure                               Abnormality         Status                     ---------                               -----------         ------                     COVID-19,[facility] [employer] IN-[facility]ANS[facility]...[[id number]]                      In process                   Please view results for these tests on the individual orders.    COVID-19,[facility] [employer] IN-HOUSE,Nasal Swab No Transport Media 3-4 HR TAT - Swab, Nasal Cavity [[id number]] Collected: 02/20/22 1510    Specimen: Swab from Nasal Cavity Updated: 02/20/22 1514    Troponin [[id number]]  (Normal) Collected: 02/20/22 1259    Specimen: Blood Updated: 02/20/22 1338     Troponin T <0.010 ng/mL     Narrative:      Troponin T Reference Range:  <= 0.03 ng/mL-   Negative for AMI  >0.03 ng/mL-     Abnormal for myocardial necrosis.  Clinicians would have to utilize clinical acumen, EKG, Troponin and serial changes to determine if it is an Acute Myocardial Infarction or myocardial injury due to an underlying chronic condition.       Results may be falsely decreased if patient taking Biotin.      Comprehensive Metabolic Panel [[id number]]  (Abnormal) Collected: 02/20/22 1058    Specimen: Blood Updated: 02/20/22 1119     Glucose 173 mg/dL      BUN 13 mg/dL      Creatinine 1.01 mg/dL      Sodium 135 mmol/L      Potassium 3.9 mmol/L      Chloride 97 mmol/L      CO2 24.7 mmol/L      Calcium 11.1 mg/dL      Total Protein 7.8 g/dL      Albumin 4.40 g/dL      ALT (SGPT)  44 U/L      AST (SGOT) 62 U/L      Alkaline Phosphatase 88 U/L      Total Bilirubin 0.6 mg/dL      eGFR Non African Amer 53 mL/min/1.73      Globulin 3.4 gm/dL      A/G Ratio 1.3 g/dL      BUN/Creatinine Ratio 12.9     Anion Gap 13.3 mmol/L     Narrative:      GFR Normal >60  Chronic Kidney Disease <60  Kidney Failure <15      Troponin [[id number]]  (Normal) Collected: 02/20/22 1058    Specimen: Blood Updated: 02/20/22 1119     Troponin T <0.010 ng/mL     Narrative:      Troponin T Reference Range:  <= 0.03 ng/mL-   Negative for AMI  >0.03 ng/mL-     Abnormal for myocardial necrosis.  Clinicians would have to utilize clinical acumen, EKG, Troponin and serial changes to determine if it is an Acute Myocardial Infarction or myocardial injury due to an underlying chronic condition.       Results may be falsely decreased if patient taking Biotin.      CBC & Differential [[id number]]  (Abnormal) Collected: 02/20/22 1058    Specimen: Blood Updated: 02/20/22 1104    Narrative:      The following orders were created for panel order CBC & Differential.  Procedure                               Abnormality         Status                     ---------                               -----------         ------                     CBC Auto Differential[[id number]]        Abnormal            Final result                 Please view results for these tests on the individual orders.    CBC Auto Differential [[id number]]  (Abnormal) Collected: 02/20/22 1058    Specimen: Blood Updated: 02/20/22 1104     WBC 5.50 10*3/mm3      RBC 4.39 10*6/mm3      Hemoglobin 13.8 g/dL      Hematocrit 43.3 %      MCV 98.6 fL      MCH 31.4 pg      MCHC 31.9 g/dL      RDW 13.7 %      RDW-SD 51.2 fl      MPV 10.7 fL      Platelets 345 10*3/mm3      Neutrophil % 57.8 %      Lymphocyte % 27.6 %      Monocyte % 12.0 %      Eosinophil % 1.3 %      Basophil % 1.1 %      Immature Grans % 0.2 %      Neutrophils, Absolute 3.18 10*3/mm3      Lymphocytes, Absolute 1.52  10*3/mm3      Monocytes, Absolute 0.66 10*3/mm3      Eosinophils, Absolute 0.07 10*3/mm3      Basophils, Absolute 0.06 10*3/mm3      Immature Grans, Absolute 0.01 10*3/mm3           Imaging Results (Most Recent)     Procedure Component Value Units Date/Time    XR Chest 2 View [[id number]] Collected: 02/20/22 1113     Updated: 02/20/22 1115    Narrative:      CR Chest 2 Vws    INDICATION:    Left arm pain and neck pain beginning today    COMPARISON:    5/25/2015    FINDINGS:   PA and lateral views of the chest.  Heart size is normal. The aorta is tortuous. Both lungs are clear with normal vascular markings. No effusions are seen.        Impression:      No acute cardiopulmonary findings.    Signer Name: Gus Barney MD   Signed: 2/20/2022 11:13 AM   Workstation Name: [id number]    [facility]        reviewed personally no acute process  ECG/EMG Results (most recent)     Procedure Component Value Units Date/Time    ECG 12 Lead [[id number]] Collected: 02/20/22 1051     Updated: 02/20/22 1053     QT Interval 384 ms     Narrative:      HEART RATE= 84  bpm  RR Interval= 716  ms  NC Interval= 147  ms  P Horizontal Axis= -15  deg  P Front Axis= 3  deg  QRSD Interval= 110  ms  QT Interval= 384  ms  QRS Axis= 31  deg  T Wave Axis= 14  deg  - NORMAL ECG -  Sinus rhythm  Electronically Signed By:   Date and Time of Study: 2022-02-20 10:51:39        reviewed personally showing sinus rhythm      Assessment/Plan     Active Hospital Problems:  Active Hospital Problems    Diagnosis  POA   • Left upper arm pain [M79.622]  Yes      Resolved Hospital Problems   No resolved problems to display.       Left shoulder/jaw pain  -potential atypical chest pain, rule out ACS  -troponin thus far negative, trend   -EKG as above  -tele  -cardiology consult    Type II DM  -hold home meds  -continue basal levemir  -SSI, adjust prn    MEN type I w/persistent hypercalcemia  -baseline Ca level 10.5-10/7, current 11.1  -will  give low dose IVF overnight     High risk    DVT ppx-lovenox    This patient has been examined wearing appropriate Personal Protective Equipment . 02/20/22      I discussed the patient's findings and my recommendations with patient .     Electronically signed by Kaiser Mcmullen DO, 02/20/22, 15:44 EST.

## 2022-02-21 ENCOUNTER — APPOINTMENT (OUTPATIENT)
Dept: CARDIOLOGY | Facility: HOSPITAL | Age: 77
End: 2022-02-21

## 2022-02-21 ENCOUNTER — APPOINTMENT (OUTPATIENT)
Dept: NUCLEAR MEDICINE | Facility: HOSPITAL | Age: 77
End: 2022-02-21

## 2022-02-21 VITALS
RESPIRATION RATE: 16 BRPM | HEIGHT: 63 IN | TEMPERATURE: 97.8 F | BODY MASS INDEX: 19.14 KG/M2 | SYSTOLIC BLOOD PRESSURE: 162 MMHG | DIASTOLIC BLOOD PRESSURE: 80 MMHG | WEIGHT: 108 LBS | OXYGEN SATURATION: 96 % | HEART RATE: 77 BPM

## 2022-02-21 LAB
ALBUMIN SERPL-MCNC: 3.9 G/DL (ref 3.5–5.2)
ALBUMIN/GLOB SERPL: 1.4 G/DL
ALP SERPL-CCNC: 77 U/L (ref 39–117)
ALT SERPL W P-5'-P-CCNC: 32 U/L (ref 1–33)
ANION GAP SERPL CALCULATED.3IONS-SCNC: 9 MMOL/L (ref 5–15)
AST SERPL-CCNC: 37 U/L (ref 1–32)
BH CV REST NUCLEAR ISOTOPE DOSE: 10.8 MCI
BH CV STRESS BP STAGE 1: NORMAL
BH CV STRESS COMMENTS STAGE 1: NORMAL
BH CV STRESS DOSE REGADENOSON STAGE 1: 0.4
BH CV STRESS DURATION MIN STAGE 1: 0
BH CV STRESS DURATION SEC STAGE 1: 30
BH CV STRESS HR STAGE 1: 94
BH CV STRESS NUCLEAR ISOTOPE DOSE: 35.7 MCI
BH CV STRESS O2 STAGE 1: 99
BH CV STRESS PROTOCOL 1: NORMAL
BH CV STRESS RECOVERY BP: NORMAL MMHG
BH CV STRESS RECOVERY HR: 106 BPM
BH CV STRESS RECOVERY O2: 98 %
BH CV STRESS STAGE 1: 1
BILIRUB SERPL-MCNC: 0.5 MG/DL (ref 0–1.2)
BUN SERPL-MCNC: 12 MG/DL (ref 8–23)
BUN/CREAT SERPL: 12.9 (ref 7–25)
CALCIUM SPEC-SCNC: 10.2 MG/DL (ref 8.6–10.5)
CHLORIDE SERPL-SCNC: 102 MMOL/L (ref 98–107)
CHOLEST SERPL-MCNC: 212 MG/DL (ref 0–200)
CO2 SERPL-SCNC: 26 MMOL/L (ref 22–29)
CREAT SERPL-MCNC: 0.93 MG/DL (ref 0.57–1)
DEPRECATED RDW RBC AUTO: 51.1 FL (ref 37–54)
ERYTHROCYTE [DISTWIDTH] IN BLOOD BY AUTOMATED COUNT: 13.9 % (ref 12.3–15.4)
GFR SERPL CREATININE-BSD FRML MDRD: 59 ML/MIN/1.73
GLOBULIN UR ELPH-MCNC: 2.7 GM/DL
GLUCOSE SERPL-MCNC: 149 MG/DL (ref 65–99)
HBA1C MFR BLD: 5.7 % (ref 4.8–5.6)
HCT VFR BLD AUTO: 40.5 % (ref 34–46.6)
HDLC SERPL-MCNC: 121 MG/DL (ref 40–60)
HGB BLD-MCNC: 12.7 G/DL (ref 12–15.9)
LDLC SERPL CALC-MCNC: 76 MG/DL (ref 0–100)
LDLC/HDLC SERPL: 0.61 {RATIO}
LV EF NUC BP: 75 %
MAXIMAL PREDICTED HEART RATE: 145 BPM
MCH RBC QN AUTO: 31.1 PG (ref 26.6–33)
MCHC RBC AUTO-ENTMCNC: 31.4 G/DL (ref 31.5–35.7)
MCV RBC AUTO: 99 FL (ref 79–97)
PERCENT MAX PREDICTED HR: 86.21 %
PLATELET # BLD AUTO: 300 10*3/MM3 (ref 140–450)
PMV BLD AUTO: 11.2 FL (ref 6–12)
POTASSIUM SERPL-SCNC: 3.9 MMOL/L (ref 3.5–5.2)
PROT SERPL-MCNC: 6.6 G/DL (ref 6–8.5)
RBC # BLD AUTO: 4.09 10*6/MM3 (ref 3.77–5.28)
SODIUM SERPL-SCNC: 137 MMOL/L (ref 136–145)
STRESS BASELINE BP: NORMAL MMHG
STRESS BASELINE HR: 87 BPM
STRESS O2 SAT REST: 100 %
STRESS PERCENT HR: 101 %
STRESS POST ESTIMATED WORKLOAD: 1 METS
STRESS POST EXERCISE DUR SEC: 30 SEC
STRESS POST O2 SAT PEAK: 100 %
STRESS POST PEAK BP: NORMAL MMHG
STRESS POST PEAK HR: 125 BPM
STRESS TARGET HR: 123 BPM
TRIGL SERPL-MCNC: 86 MG/DL (ref 0–150)
VLDLC SERPL-MCNC: 15 MG/DL (ref 5–40)
WBC NRBC COR # BLD: 5.78 10*3/MM3 (ref 3.4–10.8)

## 2022-02-21 PROCEDURE — 78452 HT MUSCLE IMAGE SPECT MULT: CPT | Performed by: INTERNAL MEDICINE

## 2022-02-21 PROCEDURE — A9500 TC99M SESTAMIBI: HCPCS | Performed by: INTERNAL MEDICINE

## 2022-02-21 PROCEDURE — G0378 HOSPITAL OBSERVATION PER HR: HCPCS

## 2022-02-21 PROCEDURE — 94761 N-INVAS EAR/PLS OXIMETRY MLT: CPT

## 2022-02-21 PROCEDURE — 94799 UNLISTED PULMONARY SVC/PX: CPT

## 2022-02-21 PROCEDURE — 93017 CV STRESS TEST TRACING ONLY: CPT

## 2022-02-21 PROCEDURE — 63710000001 INSULIN ASPART PER 5 UNITS: Performed by: INTERNAL MEDICINE

## 2022-02-21 PROCEDURE — 80061 LIPID PANEL: CPT | Performed by: HOSPITALIST

## 2022-02-21 PROCEDURE — 0 TECHNETIUM SESTAMIBI: Performed by: INTERNAL MEDICINE

## 2022-02-21 PROCEDURE — 99204 OFFICE O/P NEW MOD 45 MIN: CPT | Performed by: INTERNAL MEDICINE

## 2022-02-21 PROCEDURE — 63710000001 INSULIN DETEMIR PER 5 UNITS: Performed by: INTERNAL MEDICINE

## 2022-02-21 PROCEDURE — 93018 CV STRESS TEST I&R ONLY: CPT | Performed by: INTERNAL MEDICINE

## 2022-02-21 PROCEDURE — 83036 HEMOGLOBIN GLYCOSYLATED A1C: CPT | Performed by: HOSPITALIST

## 2022-02-21 PROCEDURE — 25010000002 REGADENOSON 0.4 MG/5ML SOLUTION: Performed by: INTERNAL MEDICINE

## 2022-02-21 PROCEDURE — 85027 COMPLETE CBC AUTOMATED: CPT | Performed by: INTERNAL MEDICINE

## 2022-02-21 PROCEDURE — 99217 PR OBSERVATION CARE DISCHARGE MANAGEMENT: CPT | Performed by: HOSPITALIST

## 2022-02-21 PROCEDURE — 78452 HT MUSCLE IMAGE SPECT MULT: CPT

## 2022-02-21 PROCEDURE — 93016 CV STRESS TEST SUPVJ ONLY: CPT | Performed by: INTERNAL MEDICINE

## 2022-02-21 PROCEDURE — 80053 COMPREHEN METABOLIC PANEL: CPT | Performed by: INTERNAL MEDICINE

## 2022-02-21 RX ORDER — ASPIRIN 81 MG/1
81 TABLET ORAL DAILY
Qty: 30 TABLET | Refills: 0 | Status: SHIPPED | OUTPATIENT
Start: 2022-02-22 | End: 2022-03-24

## 2022-02-21 RX ORDER — PRAVASTATIN SODIUM 10 MG
10 TABLET ORAL NIGHTLY
Start: 2022-02-21

## 2022-02-21 RX ADMIN — ASPIRIN 81 MG: 81 TABLET, COATED ORAL at 08:39

## 2022-02-21 RX ADMIN — INSULIN ASPART 4 UNITS: 100 INJECTION, SOLUTION INTRAVENOUS; SUBCUTANEOUS at 08:40

## 2022-02-21 RX ADMIN — PANTOPRAZOLE SODIUM 40 MG: 40 TABLET, DELAYED RELEASE ORAL at 08:40

## 2022-02-21 RX ADMIN — TECHNETIUM TC 99M SESTAMIBI 1 DOSE: 1 INJECTION INTRAVENOUS at 09:11

## 2022-02-21 RX ADMIN — SUCRALFATE 1 G: 1 TABLET ORAL at 08:40

## 2022-02-21 RX ADMIN — AMLODIPINE BESYLATE 5 MG: 5 TABLET ORAL at 08:39

## 2022-02-21 RX ADMIN — TECHNETIUM TC 99M SESTAMIBI 1 DOSE: 1 INJECTION INTRAVENOUS at 10:36

## 2022-02-21 RX ADMIN — REGADENOSON 0.4 MG: 0.08 INJECTION, SOLUTION INTRAVENOUS at 10:36

## 2022-02-21 RX ADMIN — SODIUM CHLORIDE 75 ML/HR: 9 INJECTION, SOLUTION INTRAVENOUS at 08:41

## 2022-02-21 RX ADMIN — INSULIN DETEMIR 10 UNITS: 100 INJECTION, SOLUTION SUBCUTANEOUS at 08:43

## 2022-02-21 NOTE — CONSULTS
Date of Hospital Visit: 2022  Date of consult: 2022  Encounter Provider: Damon Anglin MD  Place of Service: [facility] CARDIOLOGY  Patient Name: Lily Tong  :1945  Referral Provider: No ref. provider found    Chief complaint-left arm and jaw pain    Reason for consult: Left arm/jaw pain    History of Present Illness  Ms. Tong is a 76 years old lady with past medical history of hypertension, right breast cancer, diabetes mellitus, hyperlipidemia, neuroendocrine tumor, pancreatic cancer, hyperparathyroidism/partial parathyroidectomy and total pancreatectomy came to the ER with complaints of left arm and neck pain.  Pain started yesterday and she described it as aching of moderate severity without any associated symptoms.  She denies association of pain to exertion but that lasted for several hours and prompted her to come to the ER.  No prior known cardiovascular illness other than hypertension.  She denies any tobacco, recreational drug use or alcohol abuse.      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   • Hyperlipidemia    • Hyperparathyroidism (HCC)    • Intolerance of oral bisphosphonate therapy    • MEN 1 syndrome (HCC)     w/persistent hypercalcemia   • Osteopenia     Mild   • Pancreatic cancer (HCC)        Past Surgical History:   Procedure Laterality Date   • ADENOIDECTOMY     • BACK SURGERY  1991    disc repair   • BASAL CELL CARCINOMA EXCISION     • BREAST SURGERY Right 2008    Biopsy   • COLONOSCOPY  ,    • HERNIA REPAIR  ,    • HYSTERECTOMY      1976   • MASS EXCISION  2009   • MASTECTOMY     • PANCREATECTOMY     • PARATHYROIDECTOMY  ,     Subtotal   • SPINE SURGERY       Degenerative disks and herniated disks   • THYROID SURGERY  2009   • TONSILLECTOMY  1953       Medications Prior to Admission   Medication Sig Dispense Refill Last Dose   • acetaminophen (TYLENOL) 325 MG tablet Take 650 mg by mouth Every 6 (Six) Hours As Needed for Mild Pain  or Fever.   2/19/2022 at 2100   • Alendronate Sodium (FOSAMAX PO) Take 70 mg by mouth 1 (One) Time Per Week.   2/20/2022 at 0800   • amLODIPine (NORVASC) 2.5 MG tablet Take 5 mg by mouth Daily.   2/20/2022 at 0800   • insulin glargine (LANTUS) 100 UNIT/ML injection Inject 10 Units under the skin into the appropriate area as directed Every Morning.   2/19/2022 at Unknown time   • Insulin Glulisine (Apidra SoloStar) 100 UNIT/ML solution pen-injector Inject  under the skin into the appropriate area as directed. Max of 50 units per day   2/20/2022 at 1300   • Multiple Vitamins-Minerals (CENTRUM SILVER PO) Take 1 tablet by mouth Daily.   2/20/2022 at 0800   • pantoprazole (PROTONIX) 40 MG EC tablet Take 40 mg by mouth Daily.   2/19/2022 at 0800   • PRAVASTATIN SODIUM PO Take 10 mg by mouth Daily.   2/19/2022 at 2100   • sucralfate (CARAFATE) 1 g tablet Take 1 g by mouth Daily.   2/20/2022 at 0800   • VITAMIN D, ERGOCALCIFEROL, PO Take 20,000 Units by mouth Every Other Day.   2/19/2022 at 0900       Current Meds  Scheduled Meds:amLODIPine, 5 mg, Oral, Daily  aspirin, 81 mg, Oral, Daily  atorvastatin, 10 mg, Oral, Nightly  enoxaparin, 40 mg, Subcutaneous, Q24H  insulin aspart, 0-9 Units, Subcutaneous, TID AC  insulin detemir, 10 Units, Subcutaneous, Daily  pantoprazole, 40 mg, Oral, Daily  sodium chloride, 10 mL, Intravenous, Q12H  sucralfate, 1 g, Oral, Daily      Continuous Infusions:sodium chloride, 75 mL/hr, Last Rate: 75 mL/hr (02/20/22 2003)      PRN Meds:.•  acetaminophen **OR** acetaminophen **OR** acetaminophen  •  aluminum-magnesium hydroxide-simethicone  •  dextrose  •  dextrose  •  glucagon (human recombinant)  •  labetalol  •   "melatonin  •  nitroglycerin  •  nitroglycerin  •  ondansetron **OR** ondansetron  •  sodium chloride  •  sodium chloride    Allergies as of 2022 - Reviewed 2022   Allergen Reaction Noted   • Risedronate sodium Other (See Comments) 2016       Social History     Socioeconomic History   • Marital status:      Spouse name: Gavin   • Number of children: 1   Tobacco Use   • Smoking status: Former Smoker     Quit date:      Years since quittin.1   • Smokeless tobacco: Never Used   Substance and Sexual Activity   • Alcohol use: Yes     Alcohol/week: 1.0 standard drink     Types: 1 Glasses of wine per week     Comment: occasionally   • Drug use: No   • Sexual activity: Defer       Family History   Problem Relation Age of Onset   • Ovarian cancer Mother 65   • Colon cancer Mother 65   • Kidney failure Father 72   • Cancer Maternal Grandmother    • Breast cancer Other        REVIEW OF SYSTEMS:   All systems reviewed and pertinent positives include in HPI otherwise negative review of systems.       Objective:   Temp:  [97.6 °F (36.4 °C)-97.9 °F (36.6 °C)] 97.9 °F (36.6 °C)  Heart Rate:  [71-92] 74  Resp:  [17-19] 17  BP: (160-189)/(84-91) 165/88  Body mass index is 19.13 kg/m².  Flowsheet Rows      First Filed Value   Admission Height 160 cm (63\") Documented at 2022 1051   Admission Weight 49 kg (108 lb) Documented at 2022 1051        Vitals:    22 0626   BP: 165/88   Pulse: 74   Resp: 17   Temp: 97.9 °F (36.6 °C)   SpO2: 97%       General Appearance:    Alert, cooperative, in no acute distress   Head:    Normocephalic, without obvious abnormality, atraumatic   Eyes:            Lids and lashes normal, conjunctivae and sclerae normal, no   icterus, no pallor, corneas clear, PERRLA   Ears:    Ears appear intact with no abnormalities noted   Throat:   No oral lesions, no thrush, oral mucosa moist   Neck:   No adenopathy, supple, trachea midline, no thyromegaly, no   carotid " bruit, no JVD   Back:     No kyphosis present, no scoliosis present, no skin lesions, erythema or scars, no tenderness to percussion or palpation, range of motion normal   Lungs:     Clear to auscultation,respirations regular, even and unlabored    Heart:    Regular rhythm and normal rate, normal S1 and S2, no murmur, no gallop, no rub, no click   Chest Wall:    No abnormalities observed   Abdomen:     Normal bowel sounds, no masses, no organomegaly, soft nontender, nondistended, no guarding, no rebound  tenderness   Extremities:   Moves all extremities well, no edema, no cyanosis, no redness   Pulses:   Pulses palpable and equal bilaterally. Normal radial, carotid, femoral, dorsalis pedis and posterior tibial pulses bilaterally. Normal abdominal aorta   Skin:  Neurology:   Psychiatric:   No bleeding, bruising or rash   Normal speech and cranial nerve exam, no focal deficit   Alert and oriented x 3, normal mood and affect                 Review of Data:      Results from last 7 days   Lab Units 02/21/22  0341   SODIUM mmol/L 137   POTASSIUM mmol/L 3.9   CHLORIDE mmol/L 102   CO2 mmol/L 26.0   BUN mg/dL 12   CREATININE mg/dL 0.93   CALCIUM mg/dL 10.2   BILIRUBIN mg/dL 0.5   ALK PHOS U/L 77   ALT (SGPT) U/L 32   AST (SGOT) U/L 37*   GLUCOSE mg/dL 149*     Results from last 7 days   Lab Units 02/20/22  1700 02/20/22  1259 02/20/22  1058   TROPONIN T ng/mL <0.010 <0.010 <0.010     @LABRCNTbnp@  Results from last 7 days   Lab Units 02/21/22  0341 02/20/22  1058   WBC 10*3/mm3 5.78 5.50   HEMOGLOBIN g/dL 12.7 13.8   HEMATOCRIT % 40.5 43.3   PLATELETS 10*3/mm3 300 345             @LABRCNTIP(chol,trig,hdl,ldl)    I personally viewed and interpreted the patient's EKG/Telemetry data  )  Patient Active Problem List   Diagnosis   • Primary pancreatic neuroendocrine tumor   • MEN 1 (multiple endocrine neoplasia) (HCC)   • Malignant neoplasm of upper-outer quadrant of right female breast (HCC)   • Osteoporosis   • Iron deficiency    • Left upper arm pain         Assessment and Plan:    1.  Atypical chest pain-negative troponin and unremarkable EKG.  Pain-free at time of exam  Risk factors for CAD include hypertension   Proceed with myocardial perfusion study    2.  Hypertension-BP was 189/91 on arrival to ER.  Patient reports elevated blood pressure at home. on amlodipine  Optimize blood pressure control- add losartan to current regimen   Keep blood pressure log at home    3. MEN 1 with hypercalcemia-history of parathyroidectomy and total pancreatectomy.    4.  Hypercholesterolemia-continue statin    Patient had a normal myocardial perfusion study    Patient can be discharged home from cardiology stand point  I have discussed patient with Dr. Anderson      I will see patient in clinic in 3 months or sooner with any concerning symptoms     Thank you for consulting with cardiology and allowing me to participate in care of this patient.       Damon Anglin MD  02/21/22  08:19 EST.  Time spent in reviewing chart, discussion and examination:

## 2022-02-21 NOTE — NURSING NOTE
Patient checking her own blood sugar. 0812 patient reported her blood sugar was 212. 4 units of insulin given per sliding scale. 1158 patient reports her blood sugar was 61 and she treated herself with her own glucose.

## 2022-02-21 NOTE — DISCHARGE SUMMARY
Lily Tong  1945  [id number]    Hospitalists Discharge Summary    Date of Admission: 2/20/2022  Date of Discharge:  2/21/2022    Primary Discharge Diagnoses:  1.  Atypical Chest Pain    Secondary Discharge Diagnoses:  1.  Diabetes Mellitus, Type 2 A1c of 5.7%  2.  MEN, Type I with Persistent Hypercalcemia    History of Present Illness (taken from H&P):  76-year-old female past medical history as noted below presented to the ER for sudden onset of left arm and jaw pain.  Reports she initially woke up this morning and started to do some work however after starting some activity noticed that she had some pressure type pain radiating down her left shoulder into her left elbow and up into her left jaw.  She had no nausea vomiting or diaphoresis.  She had no shortness of breath.  She tried to rest and let the pain subside however the pain only intensified.  Since it was intensifying she came to the ER for evaluation.  In the ER she had work-up including negative troponins, negative EKG.  However she was given nitroglycerin and the pain subsided.  Given the concerning features, ER requested admission.    Hospital Course:  Ms. Tong was admitted to the Med/Surg unit.  AMI was excluded by serial biomarker study.  She was seen in consultation by Cardiology.  Stress was negative.  I did change her Pravachol to nightly from daily.  Lipid panel is still pending.    PCP  Patient Care Team:  Kahlil Dominique MD as PCP - General (Internal Medicine)  Provider, No Known as PCP - Family Medicine  Garfield Montalvo MD as Consulting Physician (Hematology and Oncology)  Watson Woodruff MD as Referring Physician (General Surgery)    Consults:   Consults     Date and Time Order Name Status Description    2/20/2022  4:17 PM Inpatient Cardiology Consult            Operations and Procedures Performed:       XR Chest 2 View    Result Date: 2/20/2022  Narrative: CR Chest 2 Vws INDICATION:  Left arm pain and neck pain beginning  today COMPARISON:  5/25/2015 FINDINGS: PA and lateral views of the chest.  Heart size is normal. The aorta is tortuous. Both lungs are clear with normal vascular markings. No effusions are seen.      Impression: No acute cardiopulmonary findings. Signer Name: Gus Barney MD  Signed: 2/20/2022 11:13 AM  Workstation Name: [id number]  [facility]    Stress Test With Myocardial Perfusion One Day    Result Date: 2/21/2022  Narrative: · Myocardial perfusion imaging indicates a normal myocardial perfusion study with no evidence of ischemia. · Left ventricular ejection fraction is hyperdynamic (Calculated EF > 70%). . · Impressions are consistent with a low risk study.        Allergies:  is allergic to risedronate sodium.      Discharge Medications:     Discharge Medications      New Medications      Instructions Start Date   aspirin 81 MG EC tablet   81 mg, Oral, Daily   Start Date: February 22, 2022        Changes to Medications      Instructions Start Date   pravastatin 10 MG tablet  Commonly known as: PRAVACHOL  What changed:   · medication strength  · when to take this   10 mg, Oral, Nightly         Continue These Medications      Instructions Start Date   acetaminophen 325 MG tablet  Commonly known as: TYLENOL   650 mg, Oral, Every 6 Hours PRN      amLODIPine 2.5 MG tablet  Commonly known as: NORVASC   5 mg, Oral, Daily      Apidra SoloStar 100 UNIT/ML solution pen-injector  Generic drug: Insulin Glulisine   Subcutaneous, Max of 50 units per day       FOSAMAX PO   70 mg, Oral, Weekly      insulin glargine 100 UNIT/ML injection  Commonly known as: LANTUS, SEMGLEE   10 Units, Subcutaneous, Every Morning      multivitamin with minerals tablet tablet   1 tablet, Oral, Daily      pantoprazole 40 MG EC tablet  Commonly known as: PROTONIX   40 mg, Oral, Daily      sucralfate 1 g tablet  Commonly known as: CARAFATE   1 g, Oral, Daily      VITAMIN D (ERGOCALCIFEROL) PO   20,000 Units, Oral, Every  Other Day             Last Lab Results:   Lab Results (most recent)     Procedure Component Value Units Date/Time    Lipid Panel [[id number]] Collected: 02/21/22 0341    Specimen: Blood Updated: 02/21/22 1306    Hemoglobin A1c [[id number]]  (Abnormal) Collected: 02/21/22 0341    Specimen: Blood Updated: 02/21/22 0854     Hemoglobin A1C 5.70 %     Narrative:      Hemoglobin A1C Ranges:    Increased Risk for Diabetes  5.7% to 6.4%  Diabetes                     >= 6.5%  Diabetic Goal                < 7.0%    Comprehensive Metabolic Panel [[id number]]  (Abnormal) Collected: 02/21/22 0341    Specimen: Blood Updated: 02/21/22 0502     Glucose 149 mg/dL      BUN 12 mg/dL      Creatinine 0.93 mg/dL      Sodium 137 mmol/L      Potassium 3.9 mmol/L      Chloride 102 mmol/L      CO2 26.0 mmol/L      Calcium 10.2 mg/dL      Total Protein 6.6 g/dL      Albumin 3.90 g/dL      ALT (SGPT) 32 U/L      AST (SGOT) 37 U/L      Alkaline Phosphatase 77 U/L      Total Bilirubin 0.5 mg/dL      eGFR Non African Amer 59 mL/min/1.73      Globulin 2.7 gm/dL      A/G Ratio 1.4 g/dL      BUN/Creatinine Ratio 12.9     Anion Gap 9.0 mmol/L     Narrative:      GFR Normal >60  Chronic Kidney Disease <60  Kidney Failure <15      CBC (No Diff) [[id number]]  (Abnormal) Collected: 02/21/22 0341    Specimen: Blood Updated: 02/21/22 0445     WBC 5.78 10*3/mm3      RBC 4.09 10*6/mm3      Hemoglobin 12.7 g/dL      Hematocrit 40.5 %      MCV 99.0 fL      MCH 31.1 pg      MCHC 31.4 g/dL      RDW 13.9 %      RDW-SD 51.1 fl      MPV 11.2 fL      Platelets 300 10*3/mm3     Troponin [[id number]]  (Normal) Collected: 02/20/22 1700    Specimen: Blood Updated: 02/20/22 1726     Troponin T <0.010 ng/mL     Narrative:      Troponin T Reference Range:  <= 0.03 ng/mL-   Negative for AMI  >0.03 ng/mL-     Abnormal for myocardial necrosis.  Clinicians would have to utilize clinical acumen, EKG, Troponin and serial changes to determine if it is an Acute Myocardial Infarction or  myocardial injury due to an underlying chronic condition.       Results may be falsely decreased if patient taking Biotin.      COVID PRE-OP / PRE-PROCEDURE SCREENING ORDER (NO ISOLATION) - Swab, Nasal Cavity [[id number]]  (Normal) Collected: 02/20/22 1510    Specimen: Swab from Nasal Cavity Updated: 02/20/22 1604    Narrative:      The following orders were created for panel order COVID PRE-OP / PRE-PROCEDURE SCREENING ORDER (NO ISOLATION) - Swab, Nasal Cavity.  Procedure                               Abnormality         Status                     ---------                               -----------         ------                     COVID-19,[employer] IN-FRANSISCA...[[id number]]  Normal              Final result                 Please view results for these tests on the individual orders.    COVID-19,[employer] IN-HOUSE,Nasal Swab No Transport Media 3-4 HR TAT - Swab, Nasal Cavity [[id number]]  (Normal) Collected: 02/20/22 1510    Specimen: Swab from Nasal Cavity Updated: 02/20/22 1604     COVID19 Not Detected    Narrative:      Fact sheet for providers: https://www.fda.gov/media/088180/download     Fact sheet for patients: https://www.fda.gov/media/370767/download    Test performed by PCR.    Consider negative results in combination with clinical observations, patient history, and epidemiological information.    Troponin [[id number]]  (Normal) Collected: 02/20/22 1259    Specimen: Blood Updated: 02/20/22 1338     Troponin T <0.010 ng/mL     Narrative:      Troponin T Reference Range:  <= 0.03 ng/mL-   Negative for AMI  >0.03 ng/mL-     Abnormal for myocardial necrosis.  Clinicians would have to utilize clinical acumen, EKG, Troponin and serial changes to determine if it is an Acute Myocardial Infarction or myocardial injury due to an underlying chronic condition.       Results may be falsely decreased if patient taking Biotin.      Comprehensive Metabolic Panel [[id number]]  (Abnormal) Collected: 02/20/22 1058    Specimen:  Blood Updated: 02/20/22 1119     Glucose 173 mg/dL      BUN 13 mg/dL      Creatinine 1.01 mg/dL      Sodium 135 mmol/L      Potassium 3.9 mmol/L      Chloride 97 mmol/L      CO2 24.7 mmol/L      Calcium 11.1 mg/dL      Total Protein 7.8 g/dL      Albumin 4.40 g/dL      ALT (SGPT) 44 U/L      AST (SGOT) 62 U/L      Alkaline Phosphatase 88 U/L      Total Bilirubin 0.6 mg/dL      eGFR Non African Amer 53 mL/min/1.73      Globulin 3.4 gm/dL      A/G Ratio 1.3 g/dL      BUN/Creatinine Ratio 12.9     Anion Gap 13.3 mmol/L     Narrative:      GFR Normal >60  Chronic Kidney Disease <60  Kidney Failure <15      CBC & Differential [[id number]]  (Abnormal) Collected: 02/20/22 1058    Specimen: Blood Updated: 02/20/22 1104    Narrative:      The following orders were created for panel order CBC & Differential.  Procedure                               Abnormality         Status                     ---------                               -----------         ------                     CBC Auto Differential[[id number]]        Abnormal            Final result                 Please view results for these tests on the individual orders.    CBC Auto Differential [[id number]]  (Abnormal) Collected: 02/20/22 1058    Specimen: Blood Updated: 02/20/22 1104     WBC 5.50 10*3/mm3      RBC 4.39 10*6/mm3      Hemoglobin 13.8 g/dL      Hematocrit 43.3 %      MCV 98.6 fL      MCH 31.4 pg      MCHC 31.9 g/dL      RDW 13.7 %      RDW-SD 51.2 fl      MPV 10.7 fL      Platelets 345 10*3/mm3      Neutrophil % 57.8 %      Lymphocyte % 27.6 %      Monocyte % 12.0 %      Eosinophil % 1.3 %      Basophil % 1.1 %      Immature Grans % 0.2 %      Neutrophils, Absolute 3.18 10*3/mm3      Lymphocytes, Absolute 1.52 10*3/mm3      Monocytes, Absolute 0.66 10*3/mm3      Eosinophils, Absolute 0.07 10*3/mm3      Basophils, Absolute 0.06 10*3/mm3      Immature Grans, Absolute 0.01 10*3/mm3         Imaging Results (Most Recent)     Procedure Component Value Units  Date/Time    XR Chest 2 View [[id number]] Collected: 02/20/22 1113     Updated: 02/20/22 1115    Narrative:      CR Chest 2 Vws    INDICATION:    Left arm pain and neck pain beginning today    COMPARISON:    5/25/2015    FINDINGS:   PA and lateral views of the chest.  Heart size is normal. The aorta is tortuous. Both lungs are clear with normal vascular markings. No effusions are seen.        Impression:      No acute cardiopulmonary findings.    Signer Name: Gus Barney MD   Signed: 2/20/2022 11:13 AM   Workstation Name: [id number]    [facility]          PROCEDURES      Condition on Discharge:  Stable    Physical Exam at Discharge  Vital Signs  Temp:  [97.6 °F (36.4 °C)-97.9 °F (36.6 °C)] 97.8 °F (36.6 °C)  Heart Rate:  [71-92] 77  Resp:  [16-19] 16  BP: (160-179)/(80-91) 162/80    Physical Exam:  Physical Exam   Constitutional: Patient appears in no acute distress   Cardiovascular: Regular rate, regular rhythm, S1 normal and S2 normal.  Exam reveals no gallop and no friction rub.  No murmur heard.  Pulmonary/Chest: Lungs are clear to auscultation bilaterally. No respiratory distress. No wheezes. No rhonchi. No rales.   Abdominal: Soft. Bowel sounds are normal. There is no tenderness.   Musculoskeletal: Normal Muscle tone  Extremities: No edema. No asymmetry.  Neurological: Cranial nerves II-XII are grossly intact with no focal deficits.    Discharge Disposition  Home    Visiting Nurse:    No     Home PT/OT:  No     Home Safety Evaluation:  No     DME  None    Discharge Diet:      Dietary Orders (From admission, onward)     Start     Ordered    02/21/22 1254  Diet Regular; Consistent Carbohydrate  Diet Effective Now        Question Answer Comment   Diet Texture / Consistency Regular    Common Modifiers Consistent Carbohydrate        02/21/22 1254                Activity at Discharge:  As tolerated      Follow-up Appointments  No future appointments.  Additional Instructions for the  Follow-ups that You Need to Schedule     Discharge Follow-up with PCP   As directed       Currently Documented PCP:    Kahlil Dominique MD    PCP Phone Number:    [phone]     Follow Up Details: 1-2 weeks               Test Results Pending at Discharge  Pending Labs     Order Current Status    Lipid Panel In process           Idris Anderson MD  02/21/22  13:21 EST

## 2022-02-21 NOTE — PROGRESS NOTES
"Adult Nutrition  Assessment/PES    Patient Name:  Lily Tong  YOB: 1945  MRN: [id number]  Admit Date:  2/20/2022    Assessment Date:  2/21/2022    Comments:  Diet education provided (see below):     Reason for Assessment     Row Name 02/21/22 1611          Reason for Assessment    Reason For Assessment other (see comments)  BMI/age     Diagnosis --  left upper arm pain, atypical chest pain, rule out ACS, diabetes                Nutrition/Diet History     Row Name 02/21/22 1614          Nutrition/Diet History    Typical Food/Fluid Intake big eater but has trouble with absorption due to history of pancreatic cancer (2008) and removal of pancreas, cannot tolerate enzymes                Anthropometrics     Row Name 02/21/22 1615          Anthropometrics    Height --  160 cm (63\")            Admit Weight    Admit Weight --  108 lb            Usual Body Weight (UBW)    Usual Body Weight --  5.6.19 100.8 lb, 8.27.18  95 lb            Body Mass Index (BMI)    BMI Assessment --  19.13                Labs/Tests/Procedures/Meds     Row Name 02/21/22 1618          Labs/Procedures/Meds    Lab Results Reviewed reviewed     Lab Results Comments [id number]            Medications    Pertinent Medications Comments had IV fluids at 75 ml/hour                Physical Findings     Row Name 02/21/22 1618          Physical Findings    Overall Physical Appearance loss of subcutaneous fat  some temporal wasting noted                Estimated/Assessed Needs     Row Name 02/21/22 1619 02/21/22 1615       Calculation Measurements    Height -- --  160 cm (63\")       Estimated/Assessed Needs    Additional Documentation Calorie Requirements (Group); Protein Requirements (Group); [place] Equation (Group); Fluid Requirements (Group) --       Calorie Requirements    Estimated Calorie Need Method [place]  1.4-1.6 activity factors --    Estimated Calorie Requirement Comment 1,337-1,528  estimated carbohydrates " needs=150-172 grams/day --       Protein Requirements    Est Protein Requirement Amount (gms/kg) 1.2 gm protein  -1.5 g/day=59-74 grams/day --       Fluid Requirements    Estimated Fluid Requirement Method RDA Method  1,337-1,528 --               Nutrition Prescription Ordered     Row Name 02/21/22 1626          Nutrition Prescription PO    Common Modifiers Consistent Carbohydrate                Evaluation of Received Nutrient/Fluid Intake     Row Name 02/21/22 1627          Fluid Intake Evaluation    Oral Fluid (mL) --  insufficient data IV fluids met estimated fluid needs           PO Evaluation    Number of Days PO Intake Evaluated Insufficient Data                     Problem/Interventions:   Problem 1     Row Name 02/21/22 1629          Nutrition Diagnoses Problem 1    Problem 1 Knowledge Deficit     Etiology (related to) MNT for Treatment/Condition     Signs/Symptoms (evidenced by) Potential Information Deficit                      Intervention Goal     Row Name 02/21/22 1629          Intervention Goal    General Provide information regarding MNT for treatment/condition                Nutrition Intervention     Row Name 02/21/22 1630          Nutrition Intervention    RD/Tech Action Follow Tx progress                  Education/Evaluation     Row Name 02/21/22 1633          Education    Education Provided education regarding     Provided education regarding --  ways to increase calories/protein in diet, small, frequent meals            Monitor/Evaluation    Monitor --  pt demonstrated good understanding via teachback and was receptive to education     Education Follow-up --  provided with handouts:  High Calorie High Protein HARIS Garza contact information                 Electronically signed by:  Haley Goetz RD  02/21/22 16:35 EST

## 2022-02-21 NOTE — CASE MANAGEMENT/SOCIAL WORK
Continued Stay Note  MICHEL Peterson     Patient Name: Lily Tong  MRN: [id number]  Today's Date: 2/21/2022    Admit Date: 2/20/2022     Discharge Plan     Row Name 02/21/22 1408       Plan    Plan plan home w     Provided Post Acute Provider List? Yes    Provided Post Acute Provider Quality & Resource List? Yes    Plan Comments Follow up visit with Mrs Tong. She is finishing her lunch. Face sheet verified. She states she is independent of ADLs including driving. She has a continuous glucose monitoring device in her arm, but denies any additional DME. She has used HH previously, but does not recall the agency. She has not used inpatient rehab previously.  She has a living will, encouraged her to bring a copy to be scanned to the medical record. She uses [facility] [place] and denies issues obtaining medications. She plans to return home with her husabnd and denies any needs at dc. CM Will continue to follow.    Row Name 02/21/22 1130       Plan    Plan plan home wt     Plan Comments Spoke with patients  at bedside. Patient is off unit for procedure. Address verified. Mr Tong requests CM return to speak with his wife to complete screening. CM # placed on white board, will follow up to discuss dc needs.               Discharge Codes    No documentation.               Expected Discharge Date and Time     Expected Discharge Date Expected Discharge Time    Feb 21, 2022             Frank Cramer RN

## 2022-02-21 NOTE — CASE MANAGEMENT/SOCIAL WORK
Continued Stay Note  MICHEL Peterson     Patient Name: Lily Tong  MRN: [id number]  Today's Date: 2/21/2022    Admit Date: 2/20/2022     Discharge Plan     Row Name 02/21/22 1130       Plan    Plan plan home wt     Plan Comments Spoke with patients  at bedside. Patient is off unit for procedure. Address verified. Mr Tong requests CM return to speak with his wife to complete screening. CM # placed on white board, will follow up to discuss dc needs.               Discharge Codes    No documentation.                     Frank Cramer RN

## 2022-02-22 ENCOUNTER — READMISSION MANAGEMENT (OUTPATIENT)
Dept: CALL CENTER | Facility: HOSPITAL | Age: 77
End: 2022-02-22

## 2022-02-22 NOTE — CASE MANAGEMENT/SOCIAL WORK
Case Management Discharge Note      Final Note: Discharged home.    Provided Post Acute Provider List?: Yes  Provided Post Acute Provider Quality & Resource List?: Yes    Selected Continued Care - Discharged on 2/21/2022 Admission date: 2/20/2022 - Discharge disposition: Home or Self Care    Destination    No services have been selected for the patient.              Durable Medical Equipment    No services have been selected for the patient.              Dialysis/Infusion    No services have been selected for the patient.              Home Medical Care    No services have been selected for the patient.              Therapy    No services have been selected for the patient.              Community Resources    No services have been selected for the patient.              Community & DME    No services have been selected for the patient.                       Final Discharge Disposition Code: 01 - home or self-care

## 2022-02-22 NOTE — OUTREACH NOTE
Prep Survey      Responses   [facility] patient discharged from? [place]   Is LACE score < 7 ? Yes   Emergency Room discharge w/ pulse ox? No   Eligibility Readm Mgmt   Discharge diagnosis Left arm/jaw pain   Does the patient have one of the following disease processes/diagnoses(primary or secondary)? Other   Does the patient have Home health ordered? No   Is there a DME ordered? No   Comments regarding appointments Follow up with Kahlil Dominique MD   Medication alerts for this patient ASA,  CHANGE - Pravachol   Prep survey completed? Yes          Negin Quezada RN

## 2022-02-23 ENCOUNTER — READMISSION MANAGEMENT (OUTPATIENT)
Dept: CALL CENTER | Facility: HOSPITAL | Age: 77
End: 2022-02-23

## 2022-02-23 NOTE — OUTREACH NOTE
LAG < 7 Survey      Responses   [facility] patient discharged from? [place]   Does the patient have one of the following disease processes/diagnoses(primary or secondary)? Other   BHLAG <7 Attempt successful? Yes   Call start time 1043   Call end time 1045   Discharge diagnosis Left arm/jaw pain   Medication alerts for this patient ASA,  CHANGE - Pravachol   Comments regarding appointments Follow up with Kahlil Dominique MD-has an appt scheduled on 2/24/2022   Does the patient have a primary care provider?  Yes   Does the patient have an appointment with their PCP within 7 days of discharge? Yes   Has the patient kept scheduled appointments due by today? Yes   Has home health visited the patient within 72 hours of discharge? N/A   Psychosocial issues? No   Did the patient receive a copy of their discharge instructions? Yes   Nursing interventions Reviewed instructions with patient   What is the patient's perception of their health status since discharge? Improving   Is the patient/caregiver able to teach back signs and symptoms related to disease process for when to call PCP? Yes   Is the patient/caregiver able to teach back signs and symptoms related to disease process for when to call 911? Yes   Is the patient/caregiver able to teach back the hierarchy of who to call/visit for symptoms/problems? PCP, Specialist, Home health nurse, Urgent Care, ED, 911 Yes   If the patient is a current smoker, are they able to teach back resources for cessation? Not a smoker   Graduated Yes   Is the patient interested in additional calls from an ambulatory ?  NOTE:  applies to high risk patients requiring additional follow-up. No   Wrap up additional comments Patient doing well. No further pain. No questions or concerns.           Brant Howard RN

## 2022-05-16 ENCOUNTER — OFFICE VISIT (OUTPATIENT)
Dept: CARDIOLOGY | Facility: CLINIC | Age: 77
End: 2022-05-16

## 2022-05-16 VITALS
BODY MASS INDEX: 17.31 KG/M2 | WEIGHT: 97.7 LBS | HEART RATE: 70 BPM | SYSTOLIC BLOOD PRESSURE: 138 MMHG | HEIGHT: 63 IN | DIASTOLIC BLOOD PRESSURE: 72 MMHG

## 2022-05-16 DIAGNOSIS — E78.00 HYPERCHOLESTEROLEMIA: ICD-10-CM

## 2022-05-16 DIAGNOSIS — I10 ESSENTIAL HYPERTENSION: Primary | ICD-10-CM

## 2022-05-16 PROCEDURE — 93000 ELECTROCARDIOGRAM COMPLETE: CPT | Performed by: INTERNAL MEDICINE

## 2022-05-16 PROCEDURE — 99213 OFFICE O/P EST LOW 20 MIN: CPT | Performed by: INTERNAL MEDICINE

## 2022-05-16 RX ORDER — IRBESARTAN 150 MG/1
150 TABLET ORAL DAILY
COMMUNITY
Start: 2022-02-28

## 2022-05-16 NOTE — PROGRESS NOTES
PATIENTINFORMATION    Date of Office Visit: 2022  Encounter Provider: Damon Anglin MD  Place of Service: [facility] CARDIOLOGY  Patient Name: Lily Tong  : 1945    Subjective:     Encounter Date:2022      Patient ID: Lily Tong is a 76 y.o. female.    Chief Complaint   Patient presents with   • Follow-up     HPI  Ms. Tong is a pleasant 77 yo lady came to cardiology clinic for post hospital discharge follow-up.  She was admitted for chest pain back in 2022 during found out to be of note cardiac etiology with normal stress testing.  She denies any recurrence of chest pain since after hospital discharge and denied any new symptoms.  If she does not exercise regularly she is reasonably active walking up and down stairs and cleaning floors in a house.  Compliant with all current treatment regimen and denied side effects from medications.  She was started on ARB before discharge to optimize blood pressure control      ROS  All systems reviewed and negative except as noted in HPI.    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   • Hyperlipidemia    • Hyperparathyroidism (HCC)    • Intolerance of oral bisphosphonate therapy    • MEN 1 syndrome (HCC)     w/persistent hypercalcemia   • Osteopenia     Mild   • Pancreatic cancer (HCC)        Past Surgical History:   Procedure Laterality Date   • ADENOIDECTOMY     • BACK SURGERY      disc repair   • BASAL CELL CARCINOMA EXCISION     • BREAST SURGERY Right 2008    Biopsy   • COLONOSCOPY  ,    • HERNIA REPAIR  ,    • HYSTERECTOMY         • MASS EXCISION  2009   • MASTECTOMY     • PANCREATECTOMY     • PARATHYROIDECTOMY  ,     Subtotal   • SPINE  "SURGERY  1991    Degenerative disks and herniated disks   • THYROID SURGERY     • TONSILLECTOMY         Social History     Socioeconomic History   • Marital status:      Spouse name: Gavin   • Number of children: 1   Tobacco Use   • Smoking status: Former Smoker     Quit date:      Years since quittin.4   • Smokeless tobacco: Never Used   Substance and Sexual Activity   • Alcohol use: Yes     Alcohol/week: 1.0 standard drink     Types: 1 Glasses of wine per week     Comment: occasionally   • Drug use: No   • Sexual activity: Defer       Family History   Problem Relation Age of Onset   • Ovarian cancer Mother 65   • Colon cancer Mother 65   • Kidney failure Father 72   • Cancer Maternal Grandmother    • Breast cancer Other            ECG 12 Lead    Date/Time: 2022 1:47 PM  Performed by: Damon Anglin MD  Authorized by: Damon Anglin MD   Comparison: compared with previous ECG from 2022  Similar to previous ECG  Rhythm: sinus rhythm  Rate: normal  Conduction: conduction normal  ST Segments: ST segments normal  T Waves: T waves normal  QRS axis: normal  Other: no other findings    Clinical impression: normal ECG               Objective:     /72 (BP Location: Right arm, Patient Position: Sitting)   Pulse 70   Ht 160 cm (63\")   Wt 44.3 kg (97 lb 11.2 oz)   BMI 17.31 kg/m²  Body mass index is 17.31 kg/m².     Constitutional:       General: Not in acute distress.     Appearance: Well-developed. Not diaphoretic.   Eyes:      Pupils: Pupils are equal, round, and reactive to light.   HENT:      Head: Normocephalic and atraumatic.   Neck:      Thyroid: No thyromegaly.   Pulmonary:      Effort: Pulmonary effort is normal. No respiratory distress.      Breath sounds: Normal breath sounds. No wheezing. No rales.   Chest:      Chest wall: Not tender to palpatation.   Cardiovascular:      Normal rate. Regular rhythm.      No gallop.   Pulses:     Intact distal pulses. "   Edema:     Peripheral edema absent.   Abdominal:      General: Bowel sounds are normal. There is no distension.      Palpations: Abdomen is soft.      Tenderness: There is no guarding.   Musculoskeletal: Normal range of motion.         General: No deformity.      Cervical back: Normal range of motion and neck supple. Skin:     General: Skin is warm and dry.      Findings: No rash.   Neurological:      Mental Status: Alert and oriented to person, place, and time.      Cranial Nerves: No cranial nerve deficit.      Deep Tendon Reflexes: Reflexes are normal and symmetric.   Psychiatric:         Judgment: Judgment normal.         Review Of Data: I have reviewed pertinent recent labs, images and documents and pertinent findings included in HPI or assessment below.    Lipid Panel    Lipid Panel 2/21/22   Total Cholesterol 212 (A)   Triglycerides 86   HDL Cholesterol 121 (A)   VLDL Cholesterol 15   LDL Cholesterol  76   LDL/HDL Ratio 0.61   (A) Abnormal value                Assessment/Plan:         1.  Atypical chest pain-negative troponin and unremarkable EKG in February 2022  Normal myocardial perfusion study  No significant coronary calcification noted on CAT scan chest done in 2016.     2.  Hypertension- fairly controlled on irbesartan and amlodipine     3. MEN 1 with hypercalcemia-history of parathyroidectomy and total pancreatectomy.  -Her father had similar problem along with paternal family members, her son in his 40s has parathyroid surgery for high calcium     4.  Hypercholesterolemia-on pravastatin that is well-tolerated  5.  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    Return to clinic in 1 year or sooner with any concerning symptoms.    Diagnosis and plan of care discussed with patient and verbalized understanding.            Your medication list          Accurate as of May 16, 2022  2:05 PM. If you have any questions, ask  your nurse or doctor.            CONTINUE taking these medications      Instructions Last Dose Given Next Dose Due   acetaminophen 325 MG tablet  Commonly known as: TYLENOL      Take 650 mg by mouth Every 6 (Six) Hours As Needed for Mild Pain  or Fever.       amLODIPine 2.5 MG tablet  Commonly known as: NORVASC      Take 5 mg by mouth Daily.       Apidra SoloStar 100 UNIT/ML solution pen-injector  Generic drug: Insulin Glulisine      Inject  under the skin into the appropriate area as directed. Max of 50 units per day       FOSAMAX PO      Take 70 mg by mouth 1 (One) Time Per Week.       insulin glargine 100 UNIT/ML injection  Commonly known as: LANTUS, SEMGLEE      Inject 10 Units under the skin into the appropriate area as directed Every Morning.       irbesartan 150 MG tablet  Commonly known as: AVAPRO      Take 150 mg by mouth Daily.       multivitamin with minerals tablet tablet      Take 1 tablet by mouth Daily.       pantoprazole 40 MG EC tablet  Commonly known as: PROTONIX      Take 40 mg by mouth Daily.       pravastatin 10 MG tablet  Commonly known as: PRAVACHOL      Take 1 tablet by mouth Every Night.       sucralfate 1 g tablet  Commonly known as: CARAFATE      Take 1 g by mouth Daily.       VITAMIN D (ERGOCALCIFEROL) PO      Take 20,000 Units by mouth Every Other Day.                  Damon Anglin MD  05/16/22  14:05 EDT

## 2023-09-15 ENCOUNTER — OFFICE VISIT (OUTPATIENT)
Dept: FAMILY MEDICINE CLINIC | Facility: CLINIC | Age: 78
End: 2023-09-15
Payer: MEDICARE

## 2023-09-15 VITALS
SYSTOLIC BLOOD PRESSURE: 160 MMHG | WEIGHT: 100.3 LBS | HEART RATE: 90 BPM | BODY MASS INDEX: 17.77 KG/M2 | DIASTOLIC BLOOD PRESSURE: 88 MMHG | OXYGEN SATURATION: 98 % | HEIGHT: 63 IN

## 2023-09-15 DIAGNOSIS — I10 ESSENTIAL HYPERTENSION: Primary | ICD-10-CM

## 2023-09-15 PROCEDURE — 3079F DIAST BP 80-89 MM HG: CPT | Performed by: FAMILY MEDICINE

## 2023-09-15 PROCEDURE — 99213 OFFICE O/P EST LOW 20 MIN: CPT | Performed by: FAMILY MEDICINE

## 2023-09-15 PROCEDURE — 3077F SYST BP >= 140 MM HG: CPT | Performed by: FAMILY MEDICINE

## 2023-09-15 RX ORDER — IRBESARTAN 150 MG/1
150 TABLET ORAL DAILY
Qty: 90 TABLET | Refills: 3 | Status: SHIPPED | OUTPATIENT
Start: 2023-09-15

## 2023-09-15 NOTE — PROGRESS NOTES
"Chief Complaint  Chief Complaint   Patient presents with    Nail Problem     Right foot big toe. Having pain        Subjective    History of Present Illness        Lily Tong presents to [facility] for   Hypertension  This is a chronic problem. The current episode started more than 1 year ago. The problem is unchanged. The problem is uncontrolled. Pertinent negatives include no chest pain, headaches, orthopnea, palpitations or shortness of breath. Risk factors for coronary artery disease include family history.      Objective   Vital Signs:   Visit Vitals  /88 (BP Location: Left arm, Patient Position: Sitting, Cuff Size: Adult)   Pulse 90   Ht 160 cm (63\")   Wt 45.5 kg (100 lb 4.8 oz)   SpO2 98%   BMI 17.77 kg/m²          Physical Exam  Vitals reviewed.   Constitutional:       Appearance: She is well-developed.   HENT:      Head: Normocephalic.      Right Ear: External ear normal.      Left Ear: External ear normal.      Nose: Nose normal.   Eyes:      Conjunctiva/sclera: Conjunctivae normal.   Cardiovascular:      Rate and Rhythm: Normal rate and regular rhythm.   Pulmonary:      Effort: Pulmonary effort is normal.      Breath sounds: Normal breath sounds.   Musculoskeletal:         General: Normal range of motion.      Cervical back: Normal range of motion and neck supple.   Skin:     General: Skin is warm and dry.      Capillary Refill: Capillary refill takes less than 2 seconds.   Neurological:      Mental Status: She is alert and oriented to person, place, and time.          Result Review :                    Assessment and Plan      Diagnoses and all orders for this visit:    1. Essential hypertension (Primary)  Assessment & Plan:  Hypertension is worsening.  Continue current treatment regimen.  Dietary sodium restriction.  Weight loss.  Regular aerobic exercise.  Continue current medications.  Blood pressure will be reassessed at the next regular " GYNECOLOGIC ONCOLOGY PROGRESS NOTE      PROBLEMS:  Brain mass  Pelvic mass in female      Pt seen and examined at bedside.     SUBJECTIVE:    Patient is without complaints.  Pain well-controlled.  Denies Nausea, Vomiting or Diarrhea.  Denies shortness of breath, chest pain or dyspnea on exertion.  Tolerating diet.    OBJECTIVE:     VITALS:  T(F): 98.1 (08-09-17 @ 07:03), Max: 98.7 (08-08-17 @ 23:27)  HR: 56 (08-09-17 @ 07:03) (56 - 77)  BP: 169/78 (08-09-17 @ 07:03) (141/73 - 169/78)  RR: 17 (08-09-17 @ 07:03) (17 - 18)  SpO2: 98% (08-09-17 @ 07:03) (94% - 100%)  Wt(kg): --          MEDICATIONS  (STANDING):  atorvastatin 40 milliGRAM(s) Oral at bedtime  dexamethasone     Tablet 2 milliGRAM(s) Oral every 8 hours  famotidine    Tablet 20 milliGRAM(s) Oral daily  hydrochlorothiazide 25 milliGRAM(s) Oral daily  metoprolol 100 milliGRAM(s) Oral every 12 hours  losartan 50 milliGRAM(s) Oral every 12 hours    MEDICATIONS  (PRN):  oxyCODONE    5 mG/acetaminophen 325 mG 1 Tablet(s) Oral every 4 hours PRN Moderate Pain  oxyCODONE    5 mG/acetaminophen 325 mG 2 Tablet(s) Oral every 4 hours PRN Severe Pain  ondansetron Injectable 4 milliGRAM(s) IV Push every 6 hours PRN Nausea and/or Vomiting  hydrALAZINE Injectable 20 milliGRAM(s) IV Push every 6 hours PRN SPB > 160      Physical Exam:  Constitutional: NAD  Pulmonary: clear to auscultation bilaterally   Cardiovascular: Regular rate and rhythm on auscultation   Abdomen: soft, non-tender, non-distended, normal bowel sounds  Extremities: no lower extremity edema or calf tenderness, Ko's sign negative.        LABS:                        12.1   15.6  )-----------( 386      ( 09 Aug 2017 06:31 )             36.6     08-09    136  |  93<L>  |  24.0<H>  ----------------------------<  127<H>  3.7   |  26.0  |  0.88    Ca    10.0      09 Aug 2017 06:31 appointment.    Orders:  -     irbesartan (AVAPRO) 150 MG tablet; Take 1 tablet by mouth Daily.  Dispense: 90 tablet; Refill: 3             Follow Up   No follow-ups on file.  Patient was given instructions and counseling regarding her condition or for health maintenance advice. Please see specific information pulled into the AVS if appropriate.

## 2023-10-25 ENCOUNTER — TELEPHONE (OUTPATIENT)
Dept: CARDIOLOGY | Facility: CLINIC | Age: 78
End: 2023-10-25

## 2023-10-25 NOTE — TELEPHONE ENCOUNTER
"Caller: Lily Tong \"Haley\"    Relationship to patient: Self    Best call back number:[phone]    Type of visit: FOLLOW UP    Requested date: ASAP    If rescheduling, when is the original appointment: 5/18/23    Additional notes:PATIENT HAVING SOME LIGHTHEADEDNESS AND LOW BLOOD PRESSURE. MAY ALSO NEED CARDIAC CLEARANCE FOR OTHER UPCOMING PROCEDURES.       "

## 2024-01-22 ENCOUNTER — OFFICE VISIT (OUTPATIENT)
Dept: FAMILY MEDICINE CLINIC | Facility: CLINIC | Age: 79
End: 2024-01-22
Payer: MEDICARE

## 2024-01-22 VITALS
OXYGEN SATURATION: 100 % | HEART RATE: 79 BPM | BODY MASS INDEX: 18.31 KG/M2 | HEIGHT: 62 IN | RESPIRATION RATE: 16 BRPM | SYSTOLIC BLOOD PRESSURE: 178 MMHG | WEIGHT: 99.5 LBS | DIASTOLIC BLOOD PRESSURE: 92 MMHG | TEMPERATURE: 97.5 F

## 2024-01-22 DIAGNOSIS — R35.0 URINE FREQUENCY: Primary | ICD-10-CM

## 2024-01-22 DIAGNOSIS — M54.50 ACUTE LEFT-SIDED LOW BACK PAIN WITHOUT SCIATICA: ICD-10-CM

## 2024-01-22 LAB
BILIRUB BLD-MCNC: NEGATIVE MG/DL
CLARITY, POC: CLEAR
COLOR UR: YELLOW
EXPIRATION DATE: ABNORMAL
GLUCOSE UR STRIP-MCNC: NEGATIVE MG/DL
KETONES UR QL: NEGATIVE
LEUKOCYTE EST, POC: ABNORMAL
Lab: ABNORMAL
NITRITE UR-MCNC: NEGATIVE MG/ML
PH UR: 5 [PH] (ref 5–8)
PROT UR STRIP-MCNC: ABNORMAL MG/DL
RBC # UR STRIP: NEGATIVE /UL
SP GR UR: 1.03 (ref 1–1.03)
UROBILINOGEN UR QL: ABNORMAL

## 2024-01-22 PROCEDURE — 3077F SYST BP >= 140 MM HG: CPT | Performed by: NURSE PRACTITIONER

## 2024-01-22 PROCEDURE — 81003 URINALYSIS AUTO W/O SCOPE: CPT | Performed by: NURSE PRACTITIONER

## 2024-01-22 PROCEDURE — 99213 OFFICE O/P EST LOW 20 MIN: CPT | Performed by: NURSE PRACTITIONER

## 2024-01-22 PROCEDURE — 3080F DIAST BP >= 90 MM HG: CPT | Performed by: NURSE PRACTITIONER

## 2024-01-22 RX ORDER — LATANOPROSTENE BUNOD 0.24 MG/ML
SOLUTION/ DROPS OPHTHALMIC
COMMUNITY
Start: 2023-09-25

## 2024-01-22 NOTE — PATIENT INSTRUCTIONS
I suspect she may have some pressure on the nerve root when you are sleeping,  Likely a muscle spasm or pinched nerve simply by the way it presented and resolve promptly, however this is all speculation, if the pain does not resolve promptly, we need further evaluation including possibly CT abdomen the pelvis and kidneys, and or MRI lumbar, severe pain fever chills emergency room      Recheck in 1 week Dr. Eldridge,  Should you have hematuria burning frequency urgency urgent recheck ER otherwise plenty of fluids plenty rest, and hopefully the pain will result we are not done and completed with this workup until your pain has resolved completely  Any recurrent back pain should prompt a workup based on your history including MRI.    As always with any renal insufficiency blood pressure should average less than 130/80 check weekly follow-up with Dr. Eldridge always at least 64 ounces of fluids and generally avoid ibuprofen naproxen Tylenol okay

## 2024-01-22 NOTE — PROGRESS NOTES
Chief Complaint  Back Pain (Pt complains of back pain in lower left back )    Subjective        Lily Tong presents to [facility]  History of Present Illness  Pleasant patient, she may awaken last night with severe left lower back pain without radiation did not radiate to her legs like she has had sciatica in the past, she thought it could be a kidney stone or other she really did not know she moves around in bed, to try to get relief, finally she had her leg from over the bed and laid on her right side and the pain subsided over 10 minutes or so, she has had no hematuria no associated left lower abdominal pain left pelvic pain nothing to radiate no weakness incontinence of bowel or bladder change,    She has no chronic low back pain but she did notice the pain probably Friday night, she has no chronic pain no night sweats unexplained weight loss does have a history of malignancy.  Breast cancer.  But no metastasis no history of recurrent kidney stones, the pain was excruciating and thankfully subsided she has really not much pain a little bit of mild discomfort at the lower left back,   No generalized left flank pain as I would expect with a kidney stone, and nothing to the pelvis.  No burning frequency urgency as well  Urinalysis today is clear.  Back Pain  This is a new problem. The current episode started in the past 7 days. The problem occurs intermittently. The problem has been coming and going since onset. The pain is present in the lumbar spine. The quality of the pain is described as aching, shooting and stabbing. The pain radiates to the left buttock. The pain is at a severity of 8/10. The pain is Worse during the night. Pertinent negatives include no abdominal pain, bladder incontinence, bowel incontinence, chest pain, dysuria, fever, leg pain, numbness, paresthesias, pelvic pain, perianal numbness, tingling, weakness or weight loss. Risk factors include history of cancer,  "history of osteoporosis, lack of exercise and menopause.   Additional comments: Alternates between nothing, dull ache and searing pain      Objective   Vital Signs:  /92   Pulse 79   Temp 97.5 °F (36.4 °C) (Infrared)   Resp 16   Ht 157.5 cm (62\")   Wt 45.1 kg (99 lb 8 oz)   SpO2 100%   BMI 18.20 kg/m²   Estimated body mass index is 18.2 kg/m² as calculated from the following:    Height as of this encounter: 157.5 cm (62\").    Weight as of this encounter: 45.1 kg (99 lb 8 oz).          Physical Exam  Vitals reviewed.   Constitutional:       General: She is not in acute distress.     Appearance: She is well-developed. She is not ill-appearing, toxic-appearing or diaphoretic.      Comments: Pleasant appears well   HENT:      Head: Normocephalic.      Nose: Nose normal.   Eyes:      General: No scleral icterus.     Conjunctiva/sclera: Conjunctivae normal.      Pupils: Pupils are equal, round, and reactive to light.   Neck:      Thyroid: No thyromegaly.      Vascular: No JVD.   Cardiovascular:      Rate and Rhythm: Normal rate and regular rhythm.      Heart sounds: Normal heart sounds. No murmur heard.     No friction rub. No gallop.   Pulmonary:      Effort: Pulmonary effort is normal. No respiratory distress.      Breath sounds: Normal breath sounds. No stridor. No wheezing or rales.   Abdominal:      General: Abdomen is flat. Bowel sounds are normal. There is no distension.      Palpations: Abdomen is soft.      Tenderness: There is no abdominal tenderness.      Comments: No hepatosplenomegaly, no ascites,   Musculoskeletal:         General: No tenderness.      Cervical back: Neck supple.   Lymphadenopathy:      Cervical: No cervical adenopathy.   Skin:     General: Skin is warm and dry.      Findings: No erythema or rash.   Neurological:      General: No focal deficit present.      Mental Status: She is alert and oriented to person, place, and time. Mental status is at baseline.      Deep Tendon " Reflexes: Reflexes are normal and symmetric.   Psychiatric:         Behavior: Behavior normal.         Thought Content: Thought content normal.         Judgment: Judgment normal.        Result Review :                Assessment and Plan   Diagnoses and all orders for this visit:    1. Urine frequency (Primary)  -     POC Urinalysis Dipstick, Automated    2. Acute left-sided low back pain without sciatica             Follow Up   No follow-ups on file.  Patient was given instructions and counseling regarding her condition or for health maintenance advice. Please see specific information pulled into the AVS if appropriate.     Patient Instructions   I suspect she may have some pressure on the nerve root when you are sleeping,  Likely a muscle spasm or pinched nerve simply by the way it presented and resolve promptly, however this is all speculation, if the pain does not resolve promptly, we need further evaluation including possibly CT abdomen the pelvis and kidneys, and or MRI lumbar, severe pain fever chills emergency room      Recheck in 1 week Dr. Eldridge,  Should you have hematuria burning frequency urgency urgent recheck ER otherwise plenty of fluids plenty rest, and hopefully the pain will result we are not done and completed with this workup until your pain has resolved completely  Any recurrent back pain should prompt a workup based on your history including MRI.    As always with any renal insufficiency blood pressure should average less than 130/80 check weekly follow-up with Dr. Eldridge always at least 64 ounces of fluids and generally avoid ibuprofen naproxen Tylenol okay               Answers submitted by the patient for this visit:  Primary Reason for Visit (Submitted on 1/22/2024)  What is the primary reason for your visit?: Back Pain

## 2024-01-26 ENCOUNTER — TELEPHONE (OUTPATIENT)
Dept: FAMILY MEDICINE CLINIC | Facility: CLINIC | Age: 79
End: 2024-01-26

## 2024-01-26 NOTE — TELEPHONE ENCOUNTER
"  Caller: Lily oTng \"Haley\"    Relationship: Self    Best call back number: [phone]     What was the call regarding:PATIENT STATES SHE IS LEAVING HER HOUSE SO IF SOMEONE TRIES TO CALL HER, PLEASE CALL [phone]   "

## 2024-01-26 NOTE — TELEPHONE ENCOUNTER
"  Caller: Lily Tong \"Haley\"    Relationship: Self    Best call back number: [phone]    What is the best time to reach you: ANYTIME     Who are you requesting to speak with (clinical staff, provider,  specific staff member): CLINICAL     What was the call regarding: PATIENT IS REQUESTING A CALL BACK TO DISCUSS THE CONTINUING BACK PAIN THAT SHE IS HAVING AND SEE WHAT HER NEXT STEPS SHOULD BE.     PLEASE ADVISE     "

## 2024-01-26 NOTE — TELEPHONE ENCOUNTER
Spoke to pt she stated she still having back pain will keep appt on 1/31/24 but if continues through the weekend will go to [facility]

## 2024-01-31 ENCOUNTER — HOSPITAL ENCOUNTER (OUTPATIENT)
Dept: GENERAL RADIOLOGY | Facility: HOSPITAL | Age: 79
Discharge: HOME | End: 2024-01-31
Admitting: FAMILY MEDICINE
Payer: MEDICARE

## 2024-01-31 ENCOUNTER — OFFICE VISIT (OUTPATIENT)
Dept: FAMILY MEDICINE CLINIC | Facility: CLINIC | Age: 79
End: 2024-01-31
Payer: MEDICARE

## 2024-01-31 VITALS
TEMPERATURE: 96.9 F | OXYGEN SATURATION: 97 % | SYSTOLIC BLOOD PRESSURE: 130 MMHG | HEIGHT: 62 IN | RESPIRATION RATE: 18 BRPM | HEART RATE: 89 BPM | WEIGHT: 98 LBS | DIASTOLIC BLOOD PRESSURE: 70 MMHG | BODY MASS INDEX: 18.03 KG/M2

## 2024-01-31 DIAGNOSIS — R30.0 DYSURIA: ICD-10-CM

## 2024-01-31 DIAGNOSIS — R10.9 FLANK PAIN: ICD-10-CM

## 2024-01-31 DIAGNOSIS — F32.89 OTHER DEPRESSION: Primary | ICD-10-CM

## 2024-01-31 PROBLEM — F32.A DEPRESSION: Status: ACTIVE | Noted: 2024-01-31

## 2024-01-31 LAB
BILIRUB BLD-MCNC: NEGATIVE MG/DL
CLARITY, POC: ABNORMAL
COLOR UR: YELLOW
EXPIRATION DATE: ABNORMAL
GLUCOSE UR STRIP-MCNC: NEGATIVE MG/DL
KETONES UR QL: NEGATIVE
LEUKOCYTE EST, POC: NEGATIVE
Lab: ABNORMAL
NITRITE UR-MCNC: NEGATIVE MG/ML
PH UR: 5 [PH] (ref 5–8)
PROT UR STRIP-MCNC: NEGATIVE MG/DL
RBC # UR STRIP: NEGATIVE /UL
SP GR UR: 1.02 (ref 1–1.03)
UROBILINOGEN UR QL: ABNORMAL

## 2024-01-31 PROCEDURE — 3075F SYST BP GE 130 - 139MM HG: CPT | Performed by: FAMILY MEDICINE

## 2024-01-31 PROCEDURE — 81003 URINALYSIS AUTO W/O SCOPE: CPT | Performed by: FAMILY MEDICINE

## 2024-01-31 PROCEDURE — 74018 RADEX ABDOMEN 1 VIEW: CPT

## 2024-01-31 PROCEDURE — 99214 OFFICE O/P EST MOD 30 MIN: CPT | Performed by: FAMILY MEDICINE

## 2024-01-31 PROCEDURE — 3078F DIAST BP <80 MM HG: CPT | Performed by: FAMILY MEDICINE

## 2024-01-31 RX ORDER — BUPROPION HYDROCHLORIDE 150 MG/1
150 TABLET ORAL DAILY
Qty: 30 TABLET | Refills: 12 | Status: SHIPPED | OUTPATIENT
Start: 2024-01-31

## 2024-01-31 RX ORDER — ACETAMINOPHEN AND CODEINE PHOSPHATE 300; 30 MG/1; MG/1
1 TABLET ORAL EVERY 6 HOURS PRN
Qty: 30 TABLET | Refills: 0 | Status: SHIPPED | OUTPATIENT
Start: 2024-01-31

## 2024-01-31 NOTE — PROGRESS NOTES
"Chief Complaint  Chief Complaint   Patient presents with    Difficulty Urinating     1 week f/u        Subjective    History of Present Illness        Lily Tong presents to [facility] for   History of Present Illness  Patient is a 78-year-old female is being seen in the clinic for multiple medical problems.  She reports that her mood has worsened over time.  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  She wants to know what she can do to help with her symptoms.  Back Pain  This is a recurrent problem. The current episode started 1 to 4 weeks ago. The problem occurs intermittently. The problem is unchanged. The pain is present in the sacro-iliac. The quality of the pain is described as aching, shooting and stabbing. The pain does not radiate. The pain is at a severity of 8/10. The pain is Worse during the night. The symptoms are aggravated by lying down. Associated symptoms include pelvic pain. Pertinent negatives include no abdominal pain, bladder incontinence, bowel incontinence, chest pain, dysuria, fever, leg pain, numbness, paresthesias, perianal numbness, tingling, weakness or weight loss. Risk factors include history of cancer, history of osteoporosis, lack of exercise, menopause and poor posture.   Additional comments: Localized.  History of sciatica but this is different.       Objective   Vital Signs:   Visit Vitals  /70   Pulse 89   Temp 96.9 °F (36.1 °C)   Resp 18   Ht 157.5 cm (62\")   Wt 44.5 kg (98 lb)   SpO2 97%   BMI 17.92 kg/m²       BMI is below normal parameters (malnutrition). Recommendations: none (medical contraindication)     Physical Exam  Vitals reviewed.   Constitutional:       Appearance: She is well-developed.   HENT:      Head: Normocephalic.      Right Ear: External ear normal.      Left Ear: External ear normal.      Nose: Nose normal.   Eyes:      Conjunctiva/sclera: Conjunctivae " normal.   Cardiovascular:      Rate and Rhythm: Normal rate and regular rhythm.   Pulmonary:      Effort: Pulmonary effort is normal.      Breath sounds: Normal breath sounds.   Chest:      Chest wall: Tenderness (Left flank pain) present.   Musculoskeletal:         General: Normal range of motion.      Cervical back: Normal range of motion and neck supple.   Skin:     General: Skin is warm and dry.      Capillary Refill: Capillary refill takes less than 2 seconds.   Neurological:      Mental Status: She is alert and oriented to person, place, and time.            Result Review :                    Assessment and Plan      Diagnoses and all orders for this visit:    1. Other depression (Primary)  Assessment & Plan:  Due to her worsening mood she was started on Wellbutrin to help with her symptoms.  Patient was encouraged to return to clinic in 1 month for follow-up.    Orders:  -     buPROPion XL (WELLBUTRIN XL) 150 MG 24 hr tablet; Take 1 tablet by mouth Daily.  Dispense: 30 tablet; Refill: 12    2. Dysuria  Assessment & Plan:  Unknown etiology of symptoms.  Possible kidney stone.  KUB was ordered results pending.  Consider a CT scan of abdomen and pelvis for further evaluation if the KUB is not diagnostic.    Orders:  -     POC Urinalysis Dipstick, Automated  -     XR Abdomen KUB    3. Flank pain  -     XR Abdomen KUB  -     acetaminophen-codeine (TYLENOL/CODEINE #3) 300-30 MG per tablet; Take 1 tablet by mouth Every 6 (Six) Hours As Needed for Moderate Pain.  Dispense: 30 tablet; Refill: 0             Follow Up   No follow-ups on file.  Patient was given instructions and counseling regarding her condition or for health maintenance advice. Please see specific information pulled into the AVS if appropriate.

## 2024-02-01 NOTE — ASSESSMENT & PLAN NOTE
Due to her worsening mood she was started on Wellbutrin to help with her symptoms.  Patient was encouraged to return to clinic in 1 month for follow-up.

## 2024-02-01 NOTE — ASSESSMENT & PLAN NOTE
Unknown etiology of symptoms.  Possible kidney stone.  KUB was ordered results pending.  Consider a CT scan of abdomen and pelvis for further evaluation if the KUB is not diagnostic.

## 2024-06-15 NOTE — TELEPHONE ENCOUNTER
----- Message from Garfield Montalvo MD sent at 11/10/2018 12:51 PM EST -----  Needs octreotide scan and referral to Dr Franko Matthew at  after I see results   Stop amlodipine.  Start Lisinopril tomorrow.  Take furosemide to control leg swelling daily as needed. This may not need to be a long term medication.

## 2024-09-13 DIAGNOSIS — I10 ESSENTIAL HYPERTENSION: ICD-10-CM

## 2024-09-16 RX ORDER — IRBESARTAN 150 MG/1
150 TABLET ORAL DAILY
Qty: 90 TABLET | Refills: 3 | Status: SHIPPED | OUTPATIENT
Start: 2024-09-16

## 2024-10-16 ENCOUNTER — OFFICE VISIT (OUTPATIENT)
Dept: FAMILY MEDICINE CLINIC | Facility: CLINIC | Age: 79
End: 2024-10-16
Payer: MEDICARE

## 2024-10-16 VITALS
BODY MASS INDEX: 18.03 KG/M2 | DIASTOLIC BLOOD PRESSURE: 80 MMHG | HEART RATE: 90 BPM | OXYGEN SATURATION: 98 % | RESPIRATION RATE: 17 BRPM | HEIGHT: 62 IN | WEIGHT: 98 LBS | SYSTOLIC BLOOD PRESSURE: 130 MMHG

## 2024-10-16 DIAGNOSIS — F51.01 PRIMARY INSOMNIA: ICD-10-CM

## 2024-10-16 DIAGNOSIS — F32.89 OTHER DEPRESSION: Primary | ICD-10-CM

## 2024-10-16 DIAGNOSIS — R05.1 ACUTE COUGH: ICD-10-CM

## 2024-10-16 PROCEDURE — 3079F DIAST BP 80-89 MM HG: CPT | Performed by: FAMILY MEDICINE

## 2024-10-16 PROCEDURE — 3075F SYST BP GE 130 - 139MM HG: CPT | Performed by: FAMILY MEDICINE

## 2024-10-16 PROCEDURE — 1125F AMNT PAIN NOTED PAIN PRSNT: CPT | Performed by: FAMILY MEDICINE

## 2024-10-16 PROCEDURE — 99214 OFFICE O/P EST MOD 30 MIN: CPT | Performed by: FAMILY MEDICINE

## 2024-10-16 RX ORDER — AMOXICILLIN AND CLAVULANATE POTASSIUM 500; 125 MG/1; MG/1
1 TABLET, FILM COATED ORAL 2 TIMES DAILY
Qty: 20 TABLET | Refills: 0 | Status: SHIPPED | OUTPATIENT
Start: 2024-10-16 | End: 2024-10-26

## 2024-10-16 RX ORDER — BUPROPION HYDROCHLORIDE 300 MG/1
300 TABLET ORAL DAILY
Qty: 90 TABLET | Refills: 2 | Status: SHIPPED | OUTPATIENT
Start: 2024-10-16

## 2024-10-16 RX ORDER — CODEINE PHOSPHATE/GUAIFENESIN 10-100MG/5
5 LIQUID (ML) ORAL 3 TIMES DAILY PRN
Qty: 180 ML | Refills: 0 | Status: SHIPPED | OUTPATIENT
Start: 2024-10-16

## 2024-10-16 NOTE — PROGRESS NOTES
Chief Complaint  Chief Complaint   Patient presents with    Cough     Pt c/o ongoing cough x 2 weeks     Anxiety     Pt here to discuss medication        Subjective    History of Present Illness        Lily Tong presents to [facility] for   History of Present Illness  The patient is a 79-year-old female who presents for evaluation of multiple medical concerns.    She has been experiencing a persistent cough, lightheadedness, and nasal congestion for the past 2 weeks. Despite two negative home COVID-19 tests, she sought immediate care last week and was diagnosed with an upper respiratory infection. She was prescribed benzonatate, but her symptoms persist. Her cough is intermittent, with periods of relief lasting a few hours before another coughing spell occurs. She reports that the cough does not disrupt her sleep, which is already disturbed due to other reasons.    She has been taking melatonin to aid sleep, but its effects only last until around 2:00 AM. Additionally, her continuous glucose monitor (CGM) alerts her when her blood sugar levels drop, which can wake her up.    She is currently on Wellbutrin and gabapentin but reports no improvement in her condition. She expresses concern about increasing her medication dosage as she does not want to feel overly sedated, but she is struggling with anxiety.    She also mentions that she has cataracts and is scheduled for treatment.    She has been taking a low dose of amoxicillin for a tooth abscess, which she completed a week ago.  Anxiety  Presents for follow-up visit.  Symptoms include depressed mood, insomnia and malaise/fatigue.  Patient reports no chest pain, confusion, dizziness, dry mouth, excessive worry, irritability, nausea, obsessions, palpitations or shortness of breath. Symptoms occur constantly. The severity of symptoms is moderate. The symptoms are aggravated by family issues. The patient sleeps 4 hours per night. The  "quality of sleep is poor. Compliance with medications is %.   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   Constitutional:       Appearance: She is well-developed.   HENT:      Head: Normocephalic.      Right Ear: External ear normal.      Left Ear: External ear normal.      Nose: Nose normal.   Eyes:      Conjunctiva/sclera: Conjunctivae normal.   Cardiovascular:      Rate and Rhythm: Normal rate and regular rhythm.   Pulmonary:      Effort: Pulmonary effort is normal.      Breath sounds: Normal breath sounds.   Musculoskeletal:         General: Normal range of motion.      Cervical back: Normal range of motion and neck supple.   Skin:     General: Skin is warm and dry.      Capillary Refill: Capillary refill takes less than 2 seconds.   Neurological:      Mental Status: She is alert and oriented to person, place, and time.        Physical Exam           Result Review :  Results                            Assessment and Plan      Diagnoses and all orders for this visit:    1. Other depression (Primary)  Assessment & Plan:  Patient's depression is a recurrent episode that is moderate without psychosis. Depression is active and stable.    Plan:   Continue current medication therapy     Followup in 3 months.     Orders:  -     buPROPion XL (WELLBUTRIN XL) 300 MG 24 hr tablet; Take 1 tablet by mouth Daily.  Dispense: 90 tablet; Refill: 2    2. Primary insomnia  Assessment & Plan:  She has been taking melatonin to help with sleep, but it only lasts a couple of hours.  Amitriptyline was recommended to help her sleep through the night.  It was confirmed that it is safe to take with her current medication, Wellbutrin.    Orders:  -     amitriptyline (ELAVIL) 25 MG tablet; Take 1 " tablet by mouth Every Night.  Dispense: 30 tablet; Refill: 12    3. Acute cough  Assessment & Plan:  she was prescribed Augmentin Tessalon Perles and Cheratussin to treat her symptoms.    Increase fluids. Tylenol/motrin for pain or fever.   Medication and medication adverse effects discussed.    Follow-up 5-7 days for reevaluation if not improved or sooner if needed.    Orders:  -     amoxicillin-clavulanate (Augmentin) 500-125 MG per tablet; Take 1 tablet by mouth 2 (Two) Times a Day for 10 days.  Dispense: 20 tablet; Refill: 0  -     guaiFENesin-codeine (GUAIFENESIN AC) 100-10 MG/5ML liquid; Take 5 mL by mouth 3 (Three) Times a Day As Needed for Cough.  Dispense: 180 mL; Refill: 0       Assessment & Plan             Follow Up   No follow-ups on file.  Patient was given instructions and counseling regarding her condition or for health maintenance advice. Please see specific information pulled into the AVS if appropriate.     Patient or patient representative verbalized consent for the use of Ambient Listening during the visit with  Abundio Eldridge Sr, MD for chart documentation. 10/28/2024  13:22 EDT  Answers submitted by the patient for this visit:  Primary Reason for Visit (Submitted on 10/15/2024)  What is the primary reason for your visit?: Anxiety

## 2024-10-25 ENCOUNTER — PATIENT MESSAGE (OUTPATIENT)
Dept: FAMILY MEDICINE CLINIC | Facility: CLINIC | Age: 79
End: 2024-10-25
Payer: MEDICARE

## 2024-10-28 NOTE — ASSESSMENT & PLAN NOTE
Patient's depression is a recurrent episode that is moderate without psychosis. Depression is active and stable.    Plan:   Continue current medication therapy     Followup in 3 months.

## 2024-10-28 NOTE — ASSESSMENT & PLAN NOTE
she was prescribed Augmentin Tessalon Perles and Cheratussin to treat her symptoms.    Increase fluids. Tylenol/motrin for pain or fever.   Medication and medication adverse effects discussed.    Follow-up 5-7 days for reevaluation if not improved or sooner if needed.

## 2024-10-28 NOTE — ASSESSMENT & PLAN NOTE
She has been taking melatonin to help with sleep, but it only lasts a couple of hours.  Amitriptyline was recommended to help her sleep through the night.  It was confirmed that it is safe to take with her current medication, Wellbutrin.

## 2025-02-14 ENCOUNTER — OFFICE VISIT (OUTPATIENT)
Dept: FAMILY MEDICINE CLINIC | Facility: CLINIC | Age: 80
End: 2025-02-14
Payer: MEDICARE

## 2025-02-14 VITALS
BODY MASS INDEX: 18.03 KG/M2 | OXYGEN SATURATION: 99 % | SYSTOLIC BLOOD PRESSURE: 130 MMHG | WEIGHT: 98 LBS | HEIGHT: 62 IN | DIASTOLIC BLOOD PRESSURE: 76 MMHG | HEART RATE: 70 BPM

## 2025-02-14 DIAGNOSIS — F32.89 OTHER DEPRESSION: Primary | ICD-10-CM

## 2025-02-14 DIAGNOSIS — R05.1 ACUTE COUGH: ICD-10-CM

## 2025-02-14 PROCEDURE — 99213 OFFICE O/P EST LOW 20 MIN: CPT | Performed by: FAMILY MEDICINE

## 2025-02-14 PROCEDURE — 3078F DIAST BP <80 MM HG: CPT | Performed by: FAMILY MEDICINE

## 2025-02-14 PROCEDURE — 1125F AMNT PAIN NOTED PAIN PRSNT: CPT | Performed by: FAMILY MEDICINE

## 2025-02-14 PROCEDURE — 3075F SYST BP GE 130 - 139MM HG: CPT | Performed by: FAMILY MEDICINE

## 2025-02-14 RX ORDER — AMOXICILLIN AND CLAVULANATE POTASSIUM 500; 125 MG/1; MG/1
1 TABLET, FILM COATED ORAL 2 TIMES DAILY
Qty: 20 TABLET | Refills: 0 | Status: SHIPPED | OUTPATIENT
Start: 2025-02-14 | End: 2025-02-24

## 2025-02-14 RX ORDER — BENZONATATE 200 MG/1
200 CAPSULE ORAL 3 TIMES DAILY PRN
Qty: 30 CAPSULE | Refills: 0 | Status: SHIPPED | OUTPATIENT
Start: 2025-02-14

## 2025-02-14 RX ORDER — HYDROCODONE BITARTRATE AND HOMATROPINE METHYLBROMIDE ORAL SOLUTION 5; 1.5 MG/5ML; MG/5ML
5 LIQUID ORAL EVERY 6 HOURS PRN
Qty: 150 ML | Refills: 0 | Status: SHIPPED | OUTPATIENT
Start: 2025-02-14

## 2025-02-14 NOTE — PROGRESS NOTES
"Chief Complaint  Chief Complaint   Patient presents with    Cough     Lightheaded, fatigue. And congestion for last couple of weeks        Subjective    History of Present Illness        Lily Tong presents to [facility] for   Sore Throat   This is a new problem. The current episode started in the past 7 days. The problem has been coming and going.   Pain is worse on right side(s). There has been no fever. The pain is at a severity of 3/10. Associated symptoms include congestion, coughing, diarrhea, headaches and neck pain. Pertinent negatives include no abdominal pain, drooling, ear pain, hoarse voice, shortness of breath, swollen glands, trouble swallowing or vomiting.      History of Present Illness      Objective   Vital Signs:   Visit Vitals  /76 (BP Location: Right arm, Patient Position: Sitting, Cuff Size: Adult)   Pulse 70   Ht 157.5 cm (62\")   Wt 44.5 kg (98 lb)   SpO2 99%   BMI 17.92 kg/m²          BMI is below normal parameters (malnutrition). Recommendations: none (medical contraindication)     Physical Exam  Vitals reviewed.   Constitutional:       Appearance: She is well-developed.   HENT:      Head: Normocephalic.      Right Ear: External ear normal.      Left Ear: External ear normal.      Nose: Nose normal.   Eyes:      Conjunctiva/sclera: Conjunctivae normal.   Cardiovascular:      Rate and Rhythm: Normal rate and regular rhythm.   Pulmonary:      Effort: Pulmonary effort is normal.      Breath sounds: Normal breath sounds.   Musculoskeletal:         General: Normal range of motion.      Cervical back: Normal range of motion and neck supple.   Skin:     General: Skin is warm and dry.      Capillary Refill: Capillary refill takes less than 2 seconds.   Neurological:      Mental Status: She is alert and oriented to person, place, and time.        Physical Exam           Result Review :  Results                            Assessment and Plan      Diagnoses and " all orders for this visit:    1. Other depression (Primary)  Assessment & Plan:  Patient's depression is a recurrent episode that is moderate without psychosis. Depression is active and stable.    Plan:   Continue current medication therapy     Followup in 3 months.     Orders:  -     Vortioxetine HBr (Trintellix) 10 MG tablet tablet; Take 1 tablet by mouth Daily.  Dispense: 30 tablet; Refill: 0    2. Acute cough  Assessment & Plan:  she was prescribed Augmentin, Tessalon Perles and Hycodan to treat her symptoms.    Increase fluids. Tylenol/motrin for pain or fever.   Medication and medication adverse effects discussed.    Follow-up 5-7 days for reevaluation if not improved or sooner if needed.      Orders:  -     benzonatate (TESSALON) 200 MG capsule; Take 1 capsule by mouth 3 (Three) Times a Day As Needed for Cough.  Dispense: 30 capsule; Refill: 0  -     amoxicillin-clavulanate (Augmentin) 500-125 MG per tablet; Take 1 tablet by mouth 2 (Two) Times a Day for 10 days.  Dispense: 20 tablet; Refill: 0  -     HYDROcodone Bit-Homatrop MBr (HYCODAN) 5-1.5 MG/5ML solution; Take 5 mL by mouth Every 6 (Six) Hours As Needed for Cough.  Dispense: 150 mL; Refill: 0       Assessment & Plan             Follow Up   No follow-ups on file.  Patient was given instructions and counseling regarding her condition or for health maintenance advice. Please see specific information pulled into the AVS if appropriate.     Answers submitted by the patient for this visit:  Sore Throat Questionnaire (Submitted on 2/12/2025)  Chief Complaint: Sore throat  drainage: Yes

## 2025-03-05 NOTE — ASSESSMENT & PLAN NOTE
she was prescribed Augmentin, Tessalon Perles and Hycodan to treat her symptoms.    Increase fluids. Tylenol/motrin for pain or fever.   Medication and medication adverse effects discussed.    Follow-up 5-7 days for reevaluation if not improved or sooner if needed.

## 2025-04-09 ENCOUNTER — OFFICE VISIT (OUTPATIENT)
Dept: FAMILY MEDICINE CLINIC | Facility: CLINIC | Age: 80
End: 2025-04-09
Payer: MEDICARE

## 2025-04-09 VITALS
HEART RATE: 82 BPM | WEIGHT: 98 LBS | BODY MASS INDEX: 18.03 KG/M2 | SYSTOLIC BLOOD PRESSURE: 138 MMHG | DIASTOLIC BLOOD PRESSURE: 70 MMHG | RESPIRATION RATE: 18 BRPM | HEIGHT: 62 IN | OXYGEN SATURATION: 98 %

## 2025-04-09 DIAGNOSIS — H92.01 RIGHT EAR PAIN: Primary | ICD-10-CM

## 2025-04-09 PROCEDURE — 3075F SYST BP GE 130 - 139MM HG: CPT | Performed by: FAMILY MEDICINE

## 2025-04-09 PROCEDURE — 99213 OFFICE O/P EST LOW 20 MIN: CPT | Performed by: FAMILY MEDICINE

## 2025-04-09 PROCEDURE — 3078F DIAST BP <80 MM HG: CPT | Performed by: FAMILY MEDICINE

## 2025-04-09 PROCEDURE — 1125F AMNT PAIN NOTED PAIN PRSNT: CPT | Performed by: FAMILY MEDICINE

## 2025-04-09 PROCEDURE — 96372 THER/PROPH/DIAG INJ SC/IM: CPT | Performed by: FAMILY MEDICINE

## 2025-04-09 RX ORDER — CEFTRIAXONE SODIUM 250 MG/1
1000 INJECTION, POWDER, FOR SOLUTION INTRAMUSCULAR; INTRAVENOUS ONCE
Status: COMPLETED | OUTPATIENT
Start: 2025-04-09 | End: 2025-04-09

## 2025-04-09 RX ORDER — SULFAMETHOXAZOLE AND TRIMETHOPRIM 800; 160 MG/1; MG/1
1 TABLET ORAL 2 TIMES DAILY
Qty: 14 TABLET | Refills: 0 | Status: SHIPPED | OUTPATIENT
Start: 2025-04-09 | End: 2025-04-16

## 2025-04-09 RX ADMIN — CEFTRIAXONE SODIUM 1000 MG: 250 INJECTION, POWDER, FOR SOLUTION INTRAMUSCULAR; INTRAVENOUS at 11:24

## 2025-04-09 NOTE — PROGRESS NOTES
"Chief Complaint  Chief Complaint   Patient presents with    Earache     Pt c/o ongoing R earache x 1 week        Subjective    History of Present Illness        Lily Tong presents to [facility] for   History of Present Illness  The patient presents for evaluation of right-sided ear pain.    She reports persistent right-sided ear pain, which she describes as radiating downwards. She also notes a sensation of pressure in the affected ear. She was previously prescribed amoxicillin 500 mg but continues to experience discomfort. 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 She expresses a feeling of being overwhelmed and describes her current state as akin to being in an echo chamber. She does not report any sore throat but mentions discomfort on the sides. She is unable to take steroids due to her diabetes.    Supplemental Information  She has a known diagnosis of a pinched nerve, but she differentiates the current symptoms from those typically associated with this condition. She had a semiannual MRI this year and has been released by her surgeon, Dr. Gus Burden. She has an appointment scheduled with Dr. Lay for a prolapse issue, which she hopes to address in June 2025.  Earache   There is pain in the right ear. This is a new problem. The current episode started in the past 7 days. The problem occurs intermittently. The problem has been coming and going. There has been no fever. The pain is at a severity of 5/10. Associated symptoms include coughing, drainage, neck pain and rhinorrhea. Pertinent negatives include no headaches, hearing loss, rash or sore throat. She has tried antibiotics for the symptoms. The treatment provided no relief.      History of Present Illness      Objective   Vital Signs:   Visit Vitals  /70   Pulse 82   Resp 18   Ht 157.5 cm (62\") "   Wt 44.5 kg (98 lb)   SpO2 98%   BMI 17.92 kg/m²                Physical Exam  Vitals reviewed.   Constitutional:       Appearance: She is well-developed.   HENT:      Head: Normocephalic.      Right Ear: External ear normal.      Left Ear: External ear normal.      Nose: Nose normal.   Eyes:      Conjunctiva/sclera: Conjunctivae normal.   Cardiovascular:      Rate and Rhythm: Normal rate and regular rhythm.   Pulmonary:      Effort: Pulmonary effort is normal.      Breath sounds: Normal breath sounds.   Musculoskeletal:         General: Normal range of motion.      Cervical back: Normal range of motion and neck supple.   Skin:     General: Skin is warm and dry.      Capillary Refill: Capillary refill takes less than 2 seconds.   Neurological:      Mental Status: She is alert and oriented to person, place, and time.        Physical Exam  The         Result Review :  Results                            Assessment and Plan      Diagnoses and all orders for this visit:    1. Right ear pain (Primary)  Assessment & Plan:  The patient reports persistent pain running from the right ear down the side of the face, with some pressure and fluid behind the eardrum. Examination revealed redness on the right side and fluid behind the eardrum, but no signs of infection. A Rocephin injection will be administered today to help alleviate the symptoms. Additionally, an oral antibiotic will be prescribed for 7 days, starting tomorrow, to further address the issue.    Orders:  -     sulfamethoxazole-trimethoprim (Bactrim DS) 800-160 MG per tablet; Take 1 tablet by mouth 2 (Two) Times a Day for 7 days.  Dispense: 14 tablet; Refill: 0  -     cefTRIAXone (ROCEPHIN) injection 1,000 mg       Assessment & Plan             Follow Up   No follow-ups on file.  Patient was given instructions and counseling regarding her condition or for health maintenance advice. Please see specific information pulled into the AVS if appropriate.     Patient or  patient representative verbalized consent for the use of Ambient Listening during the visit with  Abundio Eldridge Sr, MD for chart documentation. 4/21/2025  10:51 EDT  Answers submitted by the patient for this visit:  Ear Pain Questionnaire (Submitted on 4/8/2025)  Chief Complaint: Ear pain  dizziness: No  hoarse voice: Yes  congestion: No  jaw pain: Yes  adenopathy: Yes  tinnitus: No

## 2025-04-21 PROBLEM — H92.01 RIGHT EAR PAIN: Status: ACTIVE | Noted: 2025-04-21

## 2025-04-21 NOTE — ASSESSMENT & PLAN NOTE
The patient reports persistent pain running from the right ear down the side of the face, with some pressure and fluid behind the eardrum. Examination revealed redness on the right side and fluid behind the eardrum, but no signs of infection. A Rocephin injection will be administered today to help alleviate the symptoms. Additionally, an oral antibiotic will be prescribed for 7 days, starting tomorrow, to further address the issue.

## 2025-04-23 ENCOUNTER — OFFICE VISIT (OUTPATIENT)
Dept: FAMILY MEDICINE CLINIC | Facility: CLINIC | Age: 80
End: 2025-04-23
Payer: MEDICARE

## 2025-04-23 VITALS
HEART RATE: 80 BPM | BODY MASS INDEX: 18.03 KG/M2 | SYSTOLIC BLOOD PRESSURE: 130 MMHG | DIASTOLIC BLOOD PRESSURE: 70 MMHG | RESPIRATION RATE: 18 BRPM | OXYGEN SATURATION: 97 % | WEIGHT: 98 LBS | HEIGHT: 62 IN

## 2025-04-23 DIAGNOSIS — R49.0 HOARSENESS: ICD-10-CM

## 2025-04-23 DIAGNOSIS — H92.01 RIGHT EAR PAIN: Primary | ICD-10-CM

## 2025-04-23 DIAGNOSIS — R07.0 THROAT PAIN: ICD-10-CM

## 2025-04-23 PROCEDURE — 1125F AMNT PAIN NOTED PAIN PRSNT: CPT | Performed by: FAMILY MEDICINE

## 2025-04-23 PROCEDURE — 3078F DIAST BP <80 MM HG: CPT | Performed by: FAMILY MEDICINE

## 2025-04-23 PROCEDURE — 99213 OFFICE O/P EST LOW 20 MIN: CPT | Performed by: FAMILY MEDICINE

## 2025-04-23 PROCEDURE — 3075F SYST BP GE 130 - 139MM HG: CPT | Performed by: FAMILY MEDICINE

## 2025-04-24 PROBLEM — R07.0 THROAT PAIN: Status: ACTIVE | Noted: 2025-04-24

## 2025-04-24 PROBLEM — R49.0 HOARSENESS: Status: ACTIVE | Noted: 2025-04-24

## 2025-04-24 NOTE — ASSESSMENT & PLAN NOTE
- Reports a constant sensation running from the eardrum down the neck, along with intermittent earache.  - Ear canal is swollen but not infected.  - Referral to [facility] has been initiated for further evaluation and management.  - Previous treatment with antibiotics has not improved symptoms.

## 2025-04-24 NOTE — PROGRESS NOTES
"Chief Complaint  Chief Complaint   Patient presents with    Earache     Pt c/o ongoing earache    Sore Throat     Pt c/o ongoing sore throat     Neck Issue      Pt c/o \"crawling\" sensation on the back of her neck        Subjective    History of Present Illness        Lily Tong presents to [facility] for   History of Present Illness  The patient presents for evaluation of ear pain, throat pain, and nerve pain.    Persistent otalgia is reported, which is intermittent in nature. A constant sensation radiates from the tympanic membrane down to the neck. Hoarseness and pharyngeal discomfort are experienced, distinct from dysphagia, with the pain extending into the neck. No palpable masses are reported. Acute bilateral neck pain was experienced last week, which has since resolved except on the right side. Postnasal drainage continues, and pain is felt when inhaling air over the throat, although it is not described as a typical sore throat. An otolaryngologist has not yet been consulted. She was previously under the care of Dr. Mays at [facility] several years ago.    A new symptom of neck crepitus is reported, suspected to be related to a previous surgical intervention. A daily crawling sensation on the left side of the neck is experienced, believed to be neurological in origin. Lightheadedness upon waking is reported, which improves as the day progresses. A decline in handwriting quality is noticed, attributed to aging.    Neuropathic pain has been experienced for several years, which can be debilitating but is transient and resolves upon assuming certain positions. The pain radiates down to the shoulder blade.  Sore Throat   This is a chronic problem. The current episode started more than 1 month ago. The problem has been unchanged.   Pain is worse on right side(s). There has been no fever. The pain is at a severity of 5/10. Associated symptoms include congestion, diarrhea, ear pain, " "a hoarse voice, neck pain and swollen glands. Pertinent negatives include no abdominal pain, coughing, drooling, headaches, shortness of breath, trouble swallowing or vomiting.      History of Present Illness      Objective   Vital Signs:   Visit Vitals  /70   Pulse 80   Resp 18   Ht 157.5 cm (62\")   Wt 44.5 kg (98 lb)   SpO2 97%   BMI 17.92 kg/m²                Physical Exam  Vitals reviewed.   Constitutional:       Appearance: She is well-developed.   HENT:      Head: Normocephalic.      Right Ear: External ear normal.      Left Ear: External ear normal.      Nose: Nose normal.   Eyes:      Conjunctiva/sclera: Conjunctivae normal.   Cardiovascular:      Rate and Rhythm: Normal rate and regular rhythm.   Pulmonary:      Effort: Pulmonary effort is normal.      Breath sounds: Normal breath sounds.   Musculoskeletal:         General: Normal range of motion.      Cervical back: Normal range of motion and neck supple.   Skin:     General: Skin is warm and dry.      Capillary Refill: Capillary refill takes less than 2 seconds.   Neurological:      Mental Status: She is alert and oriented to person, place, and time.        Physical Exam  Ears: Ear canal swollen closed, no redness         Result Review :  Results                            Assessment and Plan      Diagnoses and all orders for this visit:    1. Right ear pain (Primary)  Assessment & Plan:  - Reports a constant sensation running from the eardrum down the neck, along with intermittent earache.  - Ear canal is swollen but not infected.  - Referral to [facility] has been initiated for further evaluation and management.  - Previous treatment with antibiotics has not improved symptoms.    Orders:  -     Ambulatory Referral to ENT (Otolaryngology)    2. Throat pain  Assessment & Plan:  - Experiences pain in the throat that extends down the neck, which worsens when breathing in.  - Describes the pain as different from a typical sore throat.  - Referral to " [facility] has been initiated for further evaluation and management.  - Previous treatment with antibiotics has not improved symptoms.    Orders:  -     Ambulatory Referral to ENT (Otolaryngology)    3. Hoarseness  Assessment & Plan:  - Reports feeling hoarse and experiencing lightheadedness, particularly in the morning.  - Symptoms improve as the day progresses.  - Referral to [facility] for hoarseness as part of the comprehensive evaluation.  - Monitoring for any progression or changes in symptoms.    Orders:  -     Ambulatory Referral to ENT (Otolaryngology)       Assessment & Plan             Follow Up   No follow-ups on file.  Patient was given instructions and counseling regarding her condition or for health maintenance advice. Please see specific information pulled into the AVS if appropriate.     Patient or patient representative verbalized consent for the use of Ambient Listening during the visit with  Abundio Eldridge Sr, MD for chart documentation. 4/25/2025  00:48 EDT  Answers submitted by the patient for this visit:  Sore Throat Questionnaire (Submitted on 4/21/2025)  Chief Complaint: Sore throat  drainage: Yes

## 2025-04-24 NOTE — ASSESSMENT & PLAN NOTE
- Experiences pain in the throat that extends down the neck, which worsens when breathing in.  - Describes the pain as different from a typical sore throat.  - Referral to [facility] has been initiated for further evaluation and management.  - Previous treatment with antibiotics has not improved symptoms.

## 2025-04-25 NOTE — ASSESSMENT & PLAN NOTE
- Reports feeling hoarse and experiencing lightheadedness, particularly in the morning.  - Symptoms improve as the day progresses.  - Referral to [facility] for hoarseness as part of the comprehensive evaluation.  - Monitoring for any progression or changes in symptoms.

## 2025-06-11 ENCOUNTER — OFFICE VISIT (OUTPATIENT)
Dept: FAMILY MEDICINE CLINIC | Facility: CLINIC | Age: 80
End: 2025-06-11
Payer: MEDICARE

## 2025-06-11 VITALS
OXYGEN SATURATION: 98 % | SYSTOLIC BLOOD PRESSURE: 138 MMHG | BODY MASS INDEX: 16.63 KG/M2 | WEIGHT: 90.4 LBS | HEART RATE: 64 BPM | DIASTOLIC BLOOD PRESSURE: 80 MMHG | HEIGHT: 62 IN

## 2025-06-11 DIAGNOSIS — R10.31 RIGHT LOWER QUADRANT ABDOMINAL PAIN: Primary | ICD-10-CM

## 2025-06-11 PROCEDURE — 1125F AMNT PAIN NOTED PAIN PRSNT: CPT | Performed by: FAMILY MEDICINE

## 2025-06-11 PROCEDURE — 3079F DIAST BP 80-89 MM HG: CPT | Performed by: FAMILY MEDICINE

## 2025-06-11 PROCEDURE — 99213 OFFICE O/P EST LOW 20 MIN: CPT | Performed by: FAMILY MEDICINE

## 2025-06-11 PROCEDURE — 3075F SYST BP GE 130 - 139MM HG: CPT | Performed by: FAMILY MEDICINE

## 2025-06-14 PROBLEM — R10.31 RIGHT LOWER QUADRANT ABDOMINAL PAIN: Status: ACTIVE | Noted: 2025-06-14

## 2025-06-14 NOTE — ASSESSMENT & PLAN NOTE
- Symptoms are intermittent, lasting about a minute, and currently rated at 0.5 on a scale of 0-10.  - No specific findings on physical examination; no signs of another hernia.  - History of abdominal surgeries, including pancreatectomy and hernia repairs, suggesting possible intestinal issues.  - Advised to monitor the pain and report any significant changes, especially with upcoming rectal prolapse surgery on 06/20/2025.

## 2025-06-14 NOTE — PROGRESS NOTES
Chief Complaint  Chief Complaint   Patient presents with    Abdominal Pain     Pt here for abdominal pain on R side, for 10d       Subjective    History of Present Illness        Lily Tong presents to [facility] for   History of Present Illness  The patient presents for evaluation of lower abdominal pain.    She reports intermittent right-sided lower abdominal pain persisting for approximately 10 days. The pain is described as sudden in onset, lasting about a minute before subsiding. This morning, she experienced an episode of pain that subsequently migrated upwards. She does not have any associated fever. She rates her current pain level as 0.5 on a scale of 0 to 10. She has a history of gastrointestinal issues but notes that this pain feels different.     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 She does not have a pancreas, gallbladder, or spleen. The pain does not seem to be influenced by food or drink intake. Over the past few months, she has undergone an abdominal MRI and an ovarian ultrasound. Her white blood cell count was within normal limits during her last pre-surgical blood work. She contacted her surgeon, who advised her to monitor the situation and report back, as she is scheduled for rectal prolapse surgery on 06/20/2025. She expresses concern about potential appendicitis or other complications that could interfere with her upcoming surgery.    She had glaucoma surgery 2 weeks ago.    PAST SURGICAL HISTORY:  - Pancreatectomy  - Two hernia repairs with mesh  - Glaucoma surgery on 05/2025  Abdominal Pain  Onset:  1 to 4 weeks ago  Onset quality:  Undetermined  Frequency:  2 to 4 times per day  Progression since onset:  Coming and going  Episode duration:  Variable  Pain location:  RLQ and periumbilical region  Pain - numeric:  4/10  Pain quality:  Sharp  Radiates to:  RLQ  Associated symptoms: diarrhea and  "flatus    Associated symptoms: no anorexia, no arthralgias, no belching, no constipation, no dysuria, no fever, no frequency, no hematochezia, no hematuria, no melena, no myalgias, no nausea, no vomiting and no weight loss    Aggravated by:  Nothing, certain positions and movement  Relieved by:  Nothing  Diagnostic workup:  Lower endoscopy and ultrasound     History of Present Illness      Objective   Vital Signs:   Visit Vitals  /80   Pulse 64   Ht 157.5 cm (62.01\")   Wt 41 kg (90 lb 6.4 oz)   SpO2 98%   BMI 16.53 kg/m²                Physical Exam  Vitals reviewed.   Constitutional:       Appearance: She is well-developed.   HENT:      Head: Normocephalic.      Right Ear: External ear normal.      Left Ear: External ear normal.      Nose: Nose normal.   Eyes:      Conjunctiva/sclera: Conjunctivae normal.   Cardiovascular:      Rate and Rhythm: Normal rate and regular rhythm.   Pulmonary:      Effort: Pulmonary effort is normal.      Breath sounds: Normal breath sounds.   Abdominal:      General: Abdomen is flat. Bowel sounds are normal.      Palpations: Abdomen is soft.      Tenderness: There is abdominal tenderness (mild) in the right lower quadrant.   Musculoskeletal:         General: Normal range of motion.      Cervical back: Normal range of motion and neck supple.   Skin:     General: Skin is warm and dry.      Capillary Refill: Capillary refill takes less than 2 seconds.   Neurological:      Mental Status: She is alert and oriented to person, place, and time.        Physical Exam  Respiratory: Clear to auscultation, no wheezing, rales or rhonchi  Gastrointestinal: Tenderness in the lower right abdomen, no masses or hernias detected         Result Review :  Results                            Assessment and Plan      There are no diagnoses linked to this encounter.   Assessment & Plan             Follow Up   No follow-ups on file.  Patient was given instructions and counseling regarding her condition or " for health maintenance advice. Please see specific information pulled into the AVS if appropriate.     Patient or patient representative verbalized consent for the use of Ambient Listening during the visit with  Abundio Eldridge Sr, MD for chart documentation. 6/14/2025  19:34 EDT

## 2025-06-21 ENCOUNTER — READMISSION MANAGEMENT (OUTPATIENT)
Dept: CALL CENTER | Facility: HOSPITAL | Age: 80
End: 2025-06-21
Payer: MEDICARE

## 2025-06-21 NOTE — OUTREACH NOTE
Prep Survey      Flowsheet Row Responses   [facility] patient discharged from? Non-[facility]   Is LACE score < 7 ? Non-BH Discharge   Eligibility [facility], [place], operated by [facility]   Date of Admission 06/20/25   Date of Discharge 06/21/25   Discharge Disposition Home or Self Care   Discharge diagnosis Rectal prolapse  [Proctectomy]   Does the patient have one of the following disease processes/diagnoses(primary or secondary)? General Surgery   Does the patient have Home health ordered? No   Prep survey completed? Yes            Lilly GRIFFIN - Registered Nurse              Lilly GRIFFIN - Registered Nurse

## 2025-06-23 ENCOUNTER — TRANSITIONAL CARE MANAGEMENT TELEPHONE ENCOUNTER (OUTPATIENT)
Dept: CALL CENTER | Facility: HOSPITAL | Age: 80
End: 2025-06-23
Payer: MEDICARE

## 2025-06-23 NOTE — OUTREACH NOTE
"Call Center TCM Note      Flowsheet Row Responses   [facility] patient discharged from? Non-  [[facility]]   Does the patient have one of the following disease processes/diagnoses(primary or secondary)? General Surgery   TCM attempt successful? Yes   Call start time [phone]   Call end time [phone]   Discharge diagnosis Rectal prolapse   Meds reviewed with patient/caregiver? Yes   Is the patient having any side effects they believe may be caused by any medication additions or changes? No   Does the patient have all medications ordered at discharge? Yes   Is the patient taking all medications as directed (includes completed medication regime)? Yes   Medication comments Has not needed the ultram   Comments Pt has f/u with surgeon in 3 weeks and wellness appt after that with PCP and just wants to keep that appt.   Does the patient have an appointment with their PCP within 7-14 days of discharge? No   Nursing Interventions Patient declined scheduling/rescheduling appointment at this time, Patient desires to follow up with specialty only   Psychosocial issues? No   What is the patient's perception of their health status since discharge? Improving   Is the patient/caregiver able to teach back signs and symptoms related to disease process for when to call PCP? Yes   Is the patient/caregiver able to teach back signs and symptoms related to disease process for when to call 911? Yes   Is the patient/caregiver able to teach back the hierarchy of who to call/visit for symptoms/problems? PCP, Specialist, Home health nurse, Urgent Care, ED, 911 Yes   If the patient is a current smoker, are they able to teach back resources for cessation? Not a smoker   Additional teach back comments \"so far so good\" States she knows what to look for as s/s of infection.  She will do deep breathing and coughing.  She has f/u with surgeon in 3 weeks and will see PCP for wellness after that.   TCM call completed? Yes   Wrap up additional comments Pt " had questions regarding documentation on Mychart that didn't seem like it was hers.  Advised to contact HIM   Call end time [phone]            Natacha WHITNEY - Licensed Nurse    6/23/2025, 09:06 EDT

## 2025-06-25 ENCOUNTER — LAB (OUTPATIENT)
Dept: LAB | Facility: HOSPITAL | Age: 80
End: 2025-06-25
Payer: MEDICARE

## 2025-06-25 ENCOUNTER — TRANSCRIBE ORDERS (OUTPATIENT)
Dept: ADMINISTRATIVE | Facility: HOSPITAL | Age: 80
End: 2025-06-25
Payer: MEDICARE

## 2025-06-25 DIAGNOSIS — R19.7 DIARRHEA OF PRESUMED INFECTIOUS ORIGIN: Primary | ICD-10-CM

## 2025-06-25 DIAGNOSIS — R19.7 DIARRHEA OF PRESUMED INFECTIOUS ORIGIN: ICD-10-CM

## 2025-06-25 LAB — C DIFF TOX GENS STL QL NAA+PROBE: NEGATIVE

## 2025-06-25 PROCEDURE — 87493 C DIFF AMPLIFIED PROBE: CPT

## 2025-08-06 ENCOUNTER — OFFICE VISIT (OUTPATIENT)
Dept: FAMILY MEDICINE CLINIC | Facility: CLINIC | Age: 80
End: 2025-08-06
Payer: MEDICARE

## 2025-08-06 VITALS
SYSTOLIC BLOOD PRESSURE: 160 MMHG | DIASTOLIC BLOOD PRESSURE: 80 MMHG | RESPIRATION RATE: 20 BRPM | BODY MASS INDEX: 15.94 KG/M2 | HEART RATE: 104 BPM | WEIGHT: 86.6 LBS | HEIGHT: 62 IN | OXYGEN SATURATION: 99 %

## 2025-08-06 DIAGNOSIS — M79.645 FINGER PAIN, LEFT: Primary | ICD-10-CM

## 2025-08-08 DIAGNOSIS — R10.9 FLANK PAIN: ICD-10-CM

## 2025-08-08 RX ORDER — ACETAMINOPHEN AND CODEINE PHOSPHATE 300; 30 MG/1; MG/1
1 TABLET ORAL EVERY 6 HOURS PRN
Qty: 30 TABLET | Refills: 0 | Status: SHIPPED | OUTPATIENT
Start: 2025-08-08

## 2025-08-15 PROBLEM — M79.645 FINGER PAIN, LEFT: Status: ACTIVE | Noted: 2025-08-15
